# Patient Record
Sex: MALE | Race: WHITE | NOT HISPANIC OR LATINO | Employment: FULL TIME | URBAN - METROPOLITAN AREA
[De-identification: names, ages, dates, MRNs, and addresses within clinical notes are randomized per-mention and may not be internally consistent; named-entity substitution may affect disease eponyms.]

---

## 2017-09-22 ENCOUNTER — OFFICE VISIT (OUTPATIENT)
Dept: FAMILY MEDICINE | Facility: CLINIC | Age: 52
End: 2017-09-22
Payer: COMMERCIAL

## 2017-09-22 VITALS
TEMPERATURE: 98.8 F | HEIGHT: 73 IN | DIASTOLIC BLOOD PRESSURE: 78 MMHG | SYSTOLIC BLOOD PRESSURE: 118 MMHG | BODY MASS INDEX: 26.51 KG/M2 | HEART RATE: 76 BPM | WEIGHT: 200 LBS

## 2017-09-22 DIAGNOSIS — J01.01 ACUTE RECURRENT MAXILLARY SINUSITIS: Primary | ICD-10-CM

## 2017-09-22 PROCEDURE — 99213 OFFICE O/P EST LOW 20 MIN: CPT | Performed by: NURSE PRACTITIONER

## 2017-09-22 RX ORDER — DOXYCYCLINE 100 MG/1
100 CAPSULE ORAL 2 TIMES DAILY
Qty: 14 CAPSULE | Refills: 0 | Status: SHIPPED | OUTPATIENT
Start: 2017-09-22 | End: 2017-09-29

## 2017-09-22 NOTE — MR AVS SNAPSHOT
"              After Visit Summary   2017    Gutierrez Orta    MRN: [id number]           Patient Information     Date Of Birth          1965        Visit Information        Provider Department      2017 11:20 AM Lulú Conteh APRN CNP [facility]        Today's Diagnoses     Acute recurrent maxillary sinusitis    -  1      Care Instructions    Doxycycline sent to the pharmacy for symptoms-try decongestants and Mucinex     Follow up if symptoms do not improve or worsen.            Follow-ups after your visit        Who to contact     If you have questions or need follow up information about today's clinic visit or your schedule please contact [facility] directly at [phone].  Normal or non-critical lab and imaging results will be communicated to you by MyChart, letter or phone within 4 business days after the clinic has received the results. If you do not hear from us within 7 days, please contact the clinic through MyChart or phone. If you have a critical or abnormal lab result, we will notify you by phone as soon as possible.  Submit refill requests through [facility] or call your pharmacy and they will forward the refill request to us. Please allow 3 business days for your refill to be completed.          Additional Information About Your Visit        MyChart Information     [facility] lets you send messages to your doctor, view your test results, renew your prescriptions, schedule appointments and more. To sign up, go to [email or web address] . Click on \"Log in\" on the left side of the screen, which will take you to the Welcome page. Then click on \"Sign up Now\" on the right side of the page.     You will be asked to enter the access code listed below, as well as some personal information. Please follow the directions to create your username and password.     Your access code is: [email or web address]  Expires: 2017 11:46 AM     Your access code will  " "in 90 days. If you need help or a new code, please call your [facility] clinic or [phone].        Care EveryWhere ID     This is your Care EveryWhere ID. This could be used by other organizations to access your [facility] medical records  [id number]        Your Vitals Were     Pulse Temperature Height BMI (Body Mass Index)          76 98.8  F (37.1  C) (Tympanic) 6' 1\" (1.854 m) 26.39 kg/m2         Blood Pressure from Last 3 Encounters:   09/22/17 118/78   12/29/16 121/89   10/21/16 110/74    Weight from Last 3 Encounters:   09/22/17 200 lb (90.7 kg)   12/29/16 190 lb (86.2 kg)   10/21/16 189 lb (85.7 kg)              Today, you had the following     No orders found for display         Today's Medication Changes          These changes are accurate as of: 9/22/17 11:46 AM.  If you have any questions, ask your nurse or doctor.               Start taking these medicines.        Dose/Directions    doxycycline Monohydrate 100 MG Caps   Used for:  Acute recurrent maxillary sinusitis   Started by:  Lulú Conteh APRN CNP        Dose:  100 mg   Take 1 capsule (100 mg) by mouth 2 times daily for 7 days   Quantity:  14 capsule   Refills:  0            Where to get your medicines      These medications were sent to [facility] [place] [address]     Phone:  [phone]     doxycycline Monohydrate 100 MG Caps                Primary Care Provider Office Phone # Fax #    Shilo May [phone]       [address] [place], [place] [address]        Equal Access to Services     [facility] MICH : Gee Ellis, lesley riddle, rubens javed. So [facility] [phone].    ATENCIÓN: Si habla español, tiene a santana disposición servicios gratuitos de asistencia lingüística. Llame al [phone].    We comply with applicable federal civil rights laws and [place] " laws. We do not discriminate on the basis of race, color, national origin, age, disability sex, sexual orientation or gender identity.            Thank you!     Thank you for choosing [facility]  for your care. Our goal is always to provide you with excellent care. Hearing back from our patients is one way we can continue to improve our services. Please take a few minutes to complete the written survey that you may receive in the mail after your visit with us. Thank you!             Your Updated Medication List - Protect others around you: Learn how to safely use, store and throw away your medicines at www.disposemymeds.org.          This list is accurate as of: 9/22/17 11:46 AM.  Always use your most recent med list.                   Brand Name Dispense Instructions for use Diagnosis    atorvastatin 10 MG tablet    LIPITOR    90 tablet    Take 1 tablet (10 mg) by mouth daily    Hyperlipidemia LDL goal <130       doxycycline Monohydrate 100 MG Caps     14 capsule    Take 1 capsule (100 mg) by mouth 2 times daily for 7 days    Acute recurrent maxillary sinusitis       fluticasone 50 MCG/ACT spray    FLONASE    1 Bottle    Spray 2 sprays into both nostrils daily    Allergic rhinitis, unspecified allergic rhinitis trigger, unspecified rhinitis seasonality

## 2017-09-22 NOTE — PATIENT INSTRUCTIONS
Doxycycline sent to the pharmacy for symptoms-try decongestants and Mucinex     Follow up if symptoms do not improve or worsen.

## 2017-09-22 NOTE — PROGRESS NOTES
SUBJECTIVE:   Gutierrez Orta is a 51 year old male who presents to clinic today for the following health issues:    ENT Symptoms             Symptoms: cc Present Absent Comment   Fever/Chills  x  Felt hot last night and today   Fatigue   x    Muscle Aches   x    Eye Irritation   x    Sneezing  x     Nasal Ricky/Drg  x     Sinus Pressure/Pain  x     Loss of smell  x     Dental pain  x     Sore Throat x      Swollen Glands   x    Ear Pain/Fullness  x  Both ears   Cough  x     Wheeze   x    Chest Pain   x    Shortness of breath   x    Rash   x    Other   x      Symptom duration:  three days   Symptom severity:  moderate   Treatments tried:  tylenol, cough drops   Contacts:  none     Allergy symptoms started prior-worsening symptoms in past three days  Going on a hunting trip today      Problem list and histories reviewed & adjusted, as indicated.  Additional history: as documented    Patient Active Problem List   Diagnosis     Allergic rhinitis     GERD     Hyperlipidemia LDL goal <130     Past Surgical History:   Procedure Laterality Date     COLONOSCOPY N/A 12/29/2016    Procedure: COLONOSCOPY;  Surgeon: Raghu Mishra MD;  Location: [facility]     SURGICAL HISTORY OF -       Septoplasty       Social History   Substance Use Topics     Smoking status: Never Smoker     Smokeless tobacco: Never Used     Alcohol use Yes      Comment: social     Family History   Problem Relation Age of Onset     CANCER Father      Lung-smoker     Neurologic Disorder Paternal Grandfather      parkinsons     C.A.D. Maternal Grandfather      CAB in his 50s     Respiratory Maternal Grandfather      Emphysemia     Prostate Cancer No family hx of      Colon Cancer No family hx of          Current Outpatient Prescriptions   Medication Sig Dispense Refill     doxycycline Monohydrate 100 MG CAPS Take 1 capsule (100 mg) by mouth 2 times daily for 7 days 14 capsule 0     atorvastatin (LIPITOR) 10 MG tablet Take 1 tablet (10 mg) by mouth daily 90 tablet 3  "    fluticasone (FLONASE) 50 MCG/ACT nasal spray Spray 2 sprays into both nostrils daily (Patient not taking: Reported on 9/22/2017) 1 Bottle 11     No Known Allergies  Labs reviewed in EPIC      Reviewed and updated as needed this visit by clinical staffTobacco  Allergies  Meds  Med Hx  Surg Hx  Fam Hx  Soc Hx      Reviewed and updated as needed this visit by Provider         ROS:  Constitutional, HEENT, cardiovascular, pulmonary, gi and gu systems are negative, except as otherwise noted.      OBJECTIVE:   /78 (BP Location: Right arm, Cuff Size: Adult Regular)  Pulse 76  Temp 98.8  F (37.1  C) (Tympanic)  Ht 6' 1\" (1.854 m)  Wt 200 lb (90.7 kg)  BMI 26.39 kg/m2  Body mass index is 26.39 kg/(m^2).  GENERAL: healthy, alert and no distress  HENT: normal cephalic/atraumatic, both ears: clear effusion, nose and mouth without ulcers or lesions, nasal mucosa edematous , oropharynx clear, oral mucous membranes moist and sinuses: maxillary tenderness on bilateral  NECK: no adenopathy  RESP: lungs clear to auscultation - no rales, rhonchi or wheezes  CV: regular rate and rhythm, normal S1 S2, no S3 or S4, no murmur, click or rub  ABDOMEN: soft, nontender, no hepatosplenomegaly, no masses and bowel sounds normal  PSYCH: mentation appears normal, affect normal/bright    Diagnostic Test Results:  none     ASSESSMENT/PLAN:     1. Acute recurrent maxillary sinusitis  Try symptomatic care for a few more days, antibiotics only if symptoms persist.  Symptomatic care and follow up discussed.  - doxycycline Monohydrate 100 MG CAPS; Take 1 capsule (100 mg) by mouth 2 times daily for 7 days  Dispense: 14 capsule; Refill: 0    Home care instructions were reviewed with the patient. The risks, benefits and treatment options of prescribed medications or other treatments have been discussed with the patient. The patient verbalized their understanding and should call or follow up if no improvement or if they develop further " problems.    Patient Instructions   Doxycycline sent to the pharmacy for symptoms-try decongestants and Mucinex     Follow up if symptoms do not improve or worsen.        JAQUI Lizarraga CNP  [facility]

## 2017-09-22 NOTE — NURSING NOTE
"Chief Complaint   Patient presents with     Sinus Problem       Initial /78 (BP Location: Right arm, Cuff Size: Adult Regular)  Pulse 76  Temp 98.8  F (37.1  C) (Tympanic)  Ht 6' 1\" (1.854 m)  Wt 200 lb (90.7 kg)  BMI 26.39 kg/m2 Estimated body mass index is 26.39 kg/(m^2) as calculated from the following:    Height as of this encounter: 6' 1\" (1.854 m).    Weight as of this encounter: 200 lb (90.7 kg).  Medication Reconciliation: complete    Health Maintenance that is potentially due pending provider review:  NONE    Is there anyone who you would like to be able to receive your results? No  If yes have patient fill out JASMINA    Darline Story MA       "

## 2017-11-09 DIAGNOSIS — E78.5 HYPERLIPIDEMIA LDL GOAL <130: ICD-10-CM

## 2017-11-10 RX ORDER — ATORVASTATIN CALCIUM 10 MG/1
10 TABLET, FILM COATED ORAL DAILY
Qty: 90 TABLET | Refills: 0 | Status: SHIPPED | OUTPATIENT
Start: 2017-11-10 | End: 2018-03-02

## 2018-03-02 DIAGNOSIS — E78.5 HYPERLIPIDEMIA LDL GOAL <130: ICD-10-CM

## 2018-03-02 RX ORDER — ATORVASTATIN CALCIUM 10 MG/1
TABLET, FILM COATED ORAL
Qty: 30 TABLET | Refills: 0 | Status: SHIPPED | OUTPATIENT
Start: 2018-03-02 | End: 2018-04-16

## 2018-03-02 NOTE — TELEPHONE ENCOUNTER
"Requested Prescriptions   Pending Prescriptions Disp Refills     atorvastatin (LIPITOR) 10 MG tablet [Pharmacy Med Name: ATORVASTATIN CALCIUM 10MG TABS] 90 tablet 0     Sig: TAKE ONE TABLET BY MOUTH EVERY DAY    Statins Protocol Failed    3/2/2018 10:45 AM       Failed - LDL on file in past 12 months    Recent Labs   Lab Test  07/09/15   0825   LDL  103            Passed - No abnormal creatine kinase in past 12 months    No lab results found.         Passed - Recent or future visit with authorizing provider    Patient had office visit in the last year or has a visit in the next 30 days with authorizing provider.  See \"Patient Info\" tab in inbasket, or \"Choose Columns\" in Meds & Orders section of the refill encounter.            Passed - Patient is age 18 or older        Last Written Prescription Date:  11/10/2017  Last Fill Quantity: 90,  # refills: 0   Last office visit: 9/22/2017 with prescribing provider:  Wero   Future Office Visit:      "

## 2018-04-16 DIAGNOSIS — E78.5 HYPERLIPIDEMIA LDL GOAL <130: ICD-10-CM

## 2018-04-16 RX ORDER — ATORVASTATIN CALCIUM 10 MG/1
TABLET, FILM COATED ORAL
Qty: 30 TABLET | Refills: 0 | Status: SHIPPED | OUTPATIENT
Start: 2018-04-16 | End: 2018-06-11

## 2018-04-16 NOTE — TELEPHONE ENCOUNTER
"Requested Prescriptions   Pending Prescriptions Disp Refills     atorvastatin (LIPITOR) 10 MG tablet [Pharmacy Med Name: ATORVASTATIN CALCIUM 10MG TABS]        Last Written Prescription Date:  3-2-18  Last Fill Quantity: 30,   # refills: 0  Last Office Visit: 9-22-17 DOUG Conteh Sinusitis  Future Office visit:      30 tablet 0     Sig: TAKE ONE TABLET BY MOUTH EVERY DAY (NEEDS LAB)    Statins Protocol Failed    4/16/2018 10:23 AM       Failed - LDL on file in past 12 months    Recent Labs   Lab Test  07/09/15   0825   LDL  103            Passed - No abnormal creatine kinase in past 12 months    No lab results found.            Passed - Recent (12 mo) or future (30 days) visit within the authorizing provider's specialty    Patient had office visit in the last 12 months or has a visit in the next 30 days with authorizing provider or within the authorizing provider's specialty.  See \"Patient Info\" tab in inbasket, or \"Choose Columns\" in Meds & Orders section of the refill encounter.           Passed - Patient is age 18 or older          "

## 2018-06-11 DIAGNOSIS — E78.5 HYPERLIPIDEMIA LDL GOAL <130: ICD-10-CM

## 2018-06-12 NOTE — TELEPHONE ENCOUNTER
"Requested Prescriptions   Pending Prescriptions Disp Refills     atorvastatin (LIPITOR) 10 MG tablet [Pharmacy Med Name: ATORVASTATIN CALCIUM 10MG TABS] 30 tablet 0     Sig: TAKE ONE TABLET BY MOUTH EVERY DAY (NEEDS LAB)    Statins Protocol Failed    6/11/2018  8:28 PM       Failed - LDL on file in past 12 months    Recent Labs   Lab Test  07/09/15   0825   LDL  103            Passed - No abnormal creatine kinase in past 12 months    No lab results found.            Passed - Recent (12 mo) or future (30 days) visit within the authorizing provider's specialty    Patient had office visit in the last 12 months or has a visit in the next 30 days with authorizing provider or within the authorizing provider's specialty.  See \"Patient Info\" tab in inbasket, or \"Choose Columns\" in Meds & Orders section of the refill encounter.           Passed - Patient is age 18 or older        Last Written Prescription Date:  04/16/18  Last Fill Quantity: 30,  # refills: 0   Last office visit: 9/22/2017 with prescribing provider:  09/22/17   Future Office Visit:      "

## 2018-06-13 RX ORDER — ATORVASTATIN CALCIUM 10 MG/1
10 TABLET, FILM COATED ORAL DAILY
Qty: 30 TABLET | Refills: 0 | Status: SHIPPED | OUTPATIENT
Start: 2018-06-13 | End: 2018-07-25

## 2018-07-25 DIAGNOSIS — E78.5 HYPERLIPIDEMIA LDL GOAL <130: ICD-10-CM

## 2018-07-25 RX ORDER — ATORVASTATIN CALCIUM 10 MG/1
10 TABLET, FILM COATED ORAL DAILY
Qty: 30 TABLET | Refills: 0 | Status: SHIPPED | OUTPATIENT
Start: 2018-07-25 | End: 2018-09-30

## 2018-07-25 NOTE — TELEPHONE ENCOUNTER
"Requested Prescriptions   Pending Prescriptions Disp Refills     atorvastatin (LIPITOR) 10 MG tablet [Pharmacy Med Name: ATORVASTATIN CALCIUM 10MG TABS] 30 tablet 0     Sig: TAKE ONE TABLET BY MOUTH EVERY DAY **NEEDS FOLLOW-UP APPOINTMENT**    Statins Protocol Failed    7/25/2018 11:59 AM       Failed - LDL on file in past 12 months    Recent Labs   Lab Test  07/09/15   0825   LDL  103            Passed - No abnormal creatine kinase in past 12 months    No lab results found.            Passed - Recent (12 mo) or future (30 days) visit within the authorizing provider's specialty    Patient had office visit in the last 12 months or has a visit in the next 30 days with authorizing provider or within the authorizing provider's specialty.  See \"Patient Info\" tab in inbasket, or \"Choose Columns\" in Meds & Orders section of the refill encounter.           Passed - Patient is age 18 or older        Last Written Prescription Date:  06/13/18  Last Fill Quantity: 30,  # refills: 0   Last office visit: 9/22/2017 with prescribing provider:  09/22/17   Future Office Visit:      "

## 2018-08-23 DIAGNOSIS — E78.5 HYPERLIPIDEMIA LDL GOAL <130: ICD-10-CM

## 2018-08-23 LAB
CHOLEST SERPL-MCNC: 189 MG/DL
HDLC SERPL-MCNC: 39 MG/DL
LDLC SERPL CALC-MCNC: 123 MG/DL
NONHDLC SERPL-MCNC: 150 MG/DL
TRIGL SERPL-MCNC: 135 MG/DL

## 2018-08-23 PROCEDURE — 80061 LIPID PANEL: CPT | Performed by: FAMILY MEDICINE

## 2018-08-23 PROCEDURE — 36415 COLL VENOUS BLD VENIPUNCTURE: CPT | Performed by: FAMILY MEDICINE

## 2018-08-27 NOTE — PROGRESS NOTES
Please call.  LDL is a little higher than in the recent past.  HDL borderline low.   PLAN: I suggest increasing the atorvastatin to 20mg daily.  He can take two of the 10mg pills until gone and we can send prescription for the 20mg pills #90 with three refills.   ORLANDO ROWE MD

## 2018-09-30 DIAGNOSIS — E78.5 HYPERLIPIDEMIA LDL GOAL <130: ICD-10-CM

## 2018-10-01 RX ORDER — ATORVASTATIN CALCIUM 20 MG/1
20 TABLET, FILM COATED ORAL DAILY
Qty: 90 TABLET | Refills: 3 | Status: SHIPPED | OUTPATIENT
Start: 2018-10-01 | End: 2019-11-14

## 2018-10-01 NOTE — TELEPHONE ENCOUNTER
"Requested Prescriptions   Pending Prescriptions Disp Refills     atorvastatin (LIPITOR) 10 MG tablet [Pharmacy Med Name: ATORVASTATIN CALCIUM 10MG TABS] 30 tablet 0     Sig: TAKE ONE TABLET BY MOUTH EVERY DAY - DUE FOR APPOINTMENT AND FASTING LABS IN AUGUST 2018. NO FURTHER REFILLS UNTIL APPOINTMENT    Statins Protocol Failed    9/30/2018 12:29 PM       Failed - Recent (12 mo) or future (30 days) visit within the authorizing provider's specialty    Patient had office visit in the last 12 months or has a visit in the next 30 days with authorizing provider or within the authorizing provider's specialty.  See \"Patient Info\" tab in inbasket, or \"Choose Columns\" in Meds & Orders section of the refill encounter.           Passed - LDL on file in past 12 months    Recent Labs   Lab Test  08/23/18   0743   LDL  123*            Passed - No abnormal creatine kinase in past 12 months    No lab results found.            Passed - Patient is age 18 or older        Last Written Prescription Date:  7/25/18  Last Fill Quantity: 30,  # refills: 0   Last office visit: 9/22/2017 with prescribing provider:     Future Office Visit:      "

## 2019-05-13 ENCOUNTER — TELEPHONE (OUTPATIENT)
Dept: FAMILY MEDICINE | Facility: CLINIC | Age: 54
End: 2019-05-13

## 2019-05-13 DIAGNOSIS — Z11.59 MEASLES SCREENING: Primary | ICD-10-CM

## 2019-05-13 NOTE — TELEPHONE ENCOUNTER
Reason for Call: Request for an order or referral:    Order or referral being requested: Measles immunity lab test    Date needed: as soon as possible    Has the patient been seen by the PCP for this problem?     Additional comments: He saw on the news that he is in a questionable age group for needing vaccination    Phone number Patient can be reached at:  Cell number on file:    Telephone Information:   Mobile [phone]       Best Time:     Can we leave a detailed message on this number?  YES    Call taken on 5/13/2019 at 9:40 AM by Kaya Beckett

## 2019-05-22 DIAGNOSIS — Z11.59 MEASLES SCREENING: ICD-10-CM

## 2019-05-22 PROCEDURE — 36415 COLL VENOUS BLD VENIPUNCTURE: CPT | Performed by: FAMILY MEDICINE

## 2019-05-22 PROCEDURE — 86765 RUBEOLA ANTIBODY: CPT | Performed by: FAMILY MEDICINE

## 2019-05-23 LAB — MEV IGG SER QL IA: 2.3 AI (ref 0–0.8)

## 2019-11-14 DIAGNOSIS — E78.5 HYPERLIPIDEMIA LDL GOAL <130: ICD-10-CM

## 2019-11-14 RX ORDER — ATORVASTATIN CALCIUM 20 MG/1
20 TABLET, FILM COATED ORAL DAILY
Qty: 30 TABLET | Refills: 0 | Status: SHIPPED | OUTPATIENT
Start: 2019-11-14 | End: 2019-12-02

## 2019-12-02 ENCOUNTER — OFFICE VISIT (OUTPATIENT)
Dept: FAMILY MEDICINE | Facility: CLINIC | Age: 54
End: 2019-12-02
Payer: COMMERCIAL

## 2019-12-02 VITALS
SYSTOLIC BLOOD PRESSURE: 110 MMHG | BODY MASS INDEX: 27.77 KG/M2 | HEART RATE: 70 BPM | HEIGHT: 72 IN | DIASTOLIC BLOOD PRESSURE: 74 MMHG | RESPIRATION RATE: 16 BRPM | TEMPERATURE: 98.9 F | WEIGHT: 205 LBS

## 2019-12-02 DIAGNOSIS — J30.2 SEASONAL ALLERGIC RHINITIS, UNSPECIFIED TRIGGER: ICD-10-CM

## 2019-12-02 DIAGNOSIS — Z00.00 ROUTINE GENERAL MEDICAL EXAMINATION AT A HEALTH CARE FACILITY: Primary | ICD-10-CM

## 2019-12-02 DIAGNOSIS — E78.5 HYPERLIPIDEMIA LDL GOAL <130: ICD-10-CM

## 2019-12-02 DIAGNOSIS — L98.9 SKIN LESION: ICD-10-CM

## 2019-12-02 LAB
CHOLEST SERPL-MCNC: 173 MG/DL
GLUCOSE SERPL-MCNC: 96 MG/DL (ref 70–99)
HDLC SERPL-MCNC: 38 MG/DL
LDLC SERPL CALC-MCNC: 74 MG/DL
NONHDLC SERPL-MCNC: 135 MG/DL
PSA SERPL-ACNC: 0.44 UG/L (ref 0–4)
TRIGL SERPL-MCNC: 305 MG/DL

## 2019-12-02 PROCEDURE — 82947 ASSAY GLUCOSE BLOOD QUANT: CPT | Performed by: FAMILY MEDICINE

## 2019-12-02 PROCEDURE — 80061 LIPID PANEL: CPT | Performed by: FAMILY MEDICINE

## 2019-12-02 PROCEDURE — 36415 COLL VENOUS BLD VENIPUNCTURE: CPT | Performed by: FAMILY MEDICINE

## 2019-12-02 PROCEDURE — 99396 PREV VISIT EST AGE 40-64: CPT | Performed by: FAMILY MEDICINE

## 2019-12-02 PROCEDURE — G0103 PSA SCREENING: HCPCS | Performed by: FAMILY MEDICINE

## 2019-12-02 RX ORDER — ATORVASTATIN CALCIUM 20 MG/1
20 TABLET, FILM COATED ORAL DAILY
Qty: 90 TABLET | Refills: 3 | Status: SHIPPED | OUTPATIENT
Start: 2019-12-02 | End: 2021-03-30

## 2019-12-02 RX ORDER — FLUTICASONE PROPIONATE 50 MCG
2 SPRAY, SUSPENSION (ML) NASAL DAILY
Qty: 16 G | Refills: 11 | Status: SHIPPED | OUTPATIENT
Start: 2019-12-02 | End: 2022-11-04

## 2019-12-02 ASSESSMENT — MIFFLIN-ST. JEOR: SCORE: 1808.9

## 2019-12-02 NOTE — NURSING NOTE
"Chief Complaint   Patient presents with     Physical       Initial /74   Pulse 70   Temp 98.9  F (37.2  C) (Tympanic)   Resp 16   Ht 1.822 m (5' 11.75\")   Wt 93 kg (205 lb)   BMI 28.00 kg/m   Estimated body mass index is 28 kg/m  as calculated from the following:    Height as of this encounter: 1.822 m (5' 11.75\").    Weight as of this encounter: 93 kg (205 lb).    Patient presents to the clinic using     Health Maintenance that is potentially due pending provider review:          Is there anyone who you would like to be able to receive your results?   If yes have patient fill out JASMINA    "

## 2019-12-02 NOTE — PROGRESS NOTES
SUBJECTIVE:   CC: Gutierrez Orta is an 53 year old male who presents for preventative health visit.   Chief Complaint   Patient presents with     Physical      Needs refills.   He has been feeling well.     Healthy Habits:     Getting at least 3 servings of Calcium per day:  Yes    Bi-annual eye exam:  NO    Dental care twice a year:  Yes    Sleep apnea or symptoms of sleep apnea:  None    Diet:  Regular (no restrictions)    Frequency of exercise:  2-3 days/week    Duration of exercise:  30-45 minutes    Taking medications regularly:  Yes    Medication side effects:  None    PHQ-2 Total Score: 0    Additional concerns today:  No  Exercise: running biking     Today's PHQ-2 Score:   PHQ-2 ( 1999 Pfizer) 12/2/2019   Q1: Little interest or pleasure in doing things 0   Q2: Feeling down, depressed or hopeless 0   PHQ-2 Score 0   Q1: Little interest or pleasure in doing things Not at all   Q2: Feeling down, depressed or hopeless Not at all   PHQ-2 Score 0     Abuse: Current or Past(Physical, Sexual or Emotional)- No  Do you feel safe in your environment? Yes    Social History     Tobacco Use     Smoking status: Never Smoker     Smokeless tobacco: Never Used   Substance Use Topics     Alcohol use: Yes     Comment: social   Alcohol:rarely.    If you drink alcohol do you typically have >3 drinks per day or >7 drinks per week? Yes      Alcohol Use 12/2/2019   Prescreen: >3 drinks/day or >7 drinks/week? No   Prescreen: >3 drinks/day or >7 drinks/week? -       Last PSA:   PSA   Date Value Ref Range Status   10/21/2016 0.40 0 - 4 ug/L Final     Comment:     Assay Method:  Chemiluminescence using Siemens Vista analyzer     Patient Active Problem List    Diagnosis Date Noted     Hyperlipidemia LDL goal <130 03/19/2013     Priority: Medium     GERD 10/18/2006     Priority: Medium     Allergic rhinitis 10/27/2005     Priority: Medium     Problem list name updated by automated process. Provider to review          Family history:  CV  "disease: MGF  Prostate cancer: no  Colon cancer: no    Multivitamin or Vit D use: no    Vaccines:current     Past Colon cancer screenin    ROS:  General: No change in weight, sleep or appetite.  Normal energy.  No fever or chills  Eyes: Negative for vision changes or eye problems  ENT: No problems with ears, nose or throat.  No difficulty swallowing.  Resp: No coughing, wheezing or shortness of breath  CV: No chest pains or palpitations  GI: No nausea, vomiting,  heartburn, abdominal pain, diarrhea, constipation or change in bowel habits  : No urinary frequency or dysuria, bladder or kidney problems  Musculoskeletal: No significant muscle or joint pains  Neurologic: No headaches, numbness, tingling, weakness, problems with balance or coordination  Psychiatric: No problems with anxiety, depression or mental health  Heme/immune/allergy: No history of bleeding or clotting problems or anemia.  No allergies or immune system problems  Endocrine: No history of thyroid disease, diabetes or other endocrine disorders  Skin: No rashes,worrisome lesions or skin problems    OBJECTIVE:                                                    OBJECTIVE:Blood pressure 110/74, pulse 70, temperature 98.9  F (37.2  C), temperature source Tympanic, resp. rate 16, height 1.822 m (5' 11.75\"), weight 93 kg (205 lb). BMI=Body mass index is 28 kg/m .  GENERAL APPEARANCE ADULT: Alert, no acute distress  EYES: PERRL, EOM normal, conjunctiva and lids normal  HENT: Ears and TMs normal, oral mucosa and posterior oropharynx normal  NECK: No adenopathy,masses or thyromegaly  RESP: lungs clear to auscultation   CV: normal rate, regular rhythm, no murmur or gallop  ABDOMEN: soft, no organomegaly, masses or tenderness   (male): declined  MS: extremities normal, no peripheral edema  SKIN: one raised 5mm mole upper right back very dark.     ASSESSMENT/PLAN:                                                    Lifestyle recommendations:continue to " "exercise on a regular basis  weight loss recommended (ideal BMI-body mass index is <25)  The following exams/tests were recommended and discussed for health maintenance:  Colon cancer screening recommended   Prostate cancer screening is optional starting at age 50 if normal risk, age 40 or 45 for high risk men (strong family history or blacks.)  Screening can include digital rectal exam and PSA blood test.     (Z00.00) Routine general medical examination at a health care facility  (primary encounter diagnosis)  Comment: doing well  Plan: C RIV4 (FLUBLOK) VACCINE RECOMBINANT DNA PRSRV         ANTIBIO FREE, IM [03465], GLUCOSE, PSA, screen        Flu shot today.     (E78.5) Hyperlipidemia LDL goal <130  Comment:   Plan: atorvastatin (LIPITOR) 20 MG tablet, Lipid         panel reflex to direct LDL Non-fasting        Non-fasting blood tests today.     (J30.2) Seasonal allergic rhinitis, unspecified trigger  Comment: mostly seasonal.   Plan: fluticasone (FLONASE) 50 MCG/ACT nasal spray        REfills.     (L98.9) Skin lesion  Comment: upper right back  Plan: I do recommend biopsy.  Schedule an appointment with me for biopsy-20 minutes is OK.      Shingles vaccine is recommended for those adults age 50 and older.  The newer vaccine available since 2018 is very effective in preventing shingles (over 90%).  It is not currently covered by Medicare.  Check at pharmacy for this vaccine, they can check on your cost and give you the vaccine.   The vaccine may be less expensive at a pharmacy.      COUNSELING:   Reviewed preventive health counseling, as reflected in patient instructions  Special attention given to:        HIV screeninx in teen years, 1x in adult years, and at intervals if high risk       Prostate cancer screening    Estimated body mass index is 28 kg/m  as calculated from the following:    Height as of this encounter: 1.822 m (5' 11.75\").    Weight as of this encounter: 93 kg (205 lb).     Weight " management plan: Discussed healthy diet and exercise guidelines     reports that he has never smoked. He has never used smokeless tobacco.      Counseling Resources:  ATP IV Guidelines  Pooled Cohorts Equation Calculator  FRAX Risk Assessment  ICSI Preventive Guidelines  Dietary Guidelines for Americans, 2010  USDA's MyPlate  ASA Prophylaxis  Lung CA Screening    Shilo May MD  [facility]

## 2019-12-02 NOTE — LETTER
"December 3, 2019      Gutierrez Orta  [address] [facility] [address]        Dear ,    We are writing to inform you of your test results.    Triglycerides, a type of fat found in the bloodstream is high.  HDL (\"good cholesterol\") is low.    The LDL \"bad cholesterol\" is at goal level.   PSA test (for prostate cancer screening) is normal.   The blood glucose, a test for diabetes mellitus is in the normal range.   PLAN: No new changes in treatment recommended.     Resulted Orders   GLUCOSE   Result Value Ref Range    Glucose 96 70 - 99 mg/dL      Comment:      Non Fasting   Lipid panel reflex to direct LDL Non-fasting   Result Value Ref Range    Cholesterol 173 <200 mg/dL    Triglycerides 305 (H) <150 mg/dL      Comment:      Borderline high:  150-199 mg/dl  High:             200-499 mg/dl  Very high:       >499 mg/dl  Non Fasting      HDL Cholesterol 38 (L) >39 mg/dL    LDL Cholesterol Calculated 74 <100 mg/dL      Comment:      Desirable:       <100 mg/dl    Non HDL Cholesterol 135 (H) <130 mg/dL      Comment:      Above Desirable:  130-159 mg/dl  Borderline high:  160-189 mg/dl  High:             190-219 mg/dl  Very high:       >219 mg/dl     PSA, screen   Result Value Ref Range    PSA 0.44 0 - 4 ug/L      Comment:      Assay Method:  Chemiluminescence using Siemens Vista analyzer       If you have any questions or concerns, please call the clinic at the number listed above.       Sincerely,        Shilo May MD                "

## 2019-12-02 NOTE — PATIENT INSTRUCTIONS
Preventive Health Recommendations  Male Ages 50 - 64    Yearly exam:             See your health care provider every year in order to  o   Review health changes.   o   Discuss preventive care.    o   Review your medicines if your doctor has prescribed any.     Have a cholesterol test every 5 years, or more frequently if you are at risk for high cholesterol/heart disease.     Have a diabetes test (fasting glucose) every three years. If you are at risk for diabetes, you should have this test more often.     Have a colonoscopy at age 50, or have a yearly FIT test (stool test). These exams will check for colon cancer.      Talk with your health care provider about whether or not a prostate cancer screening test (PSA) is right for you.    You should be tested each year for STDs (sexually transmitted diseases), if you re at risk.     Shots: Get a flu shot each year. Get a tetanus shot every 10 years.     Nutrition:    Eat at least 5 servings of fruits and vegetables daily.     Eat whole-grain bread, whole-wheat pasta and brown rice instead of white grains and rice.     Get adequate Calcium and Vitamin D.     Lifestyle    Exercise for at least 150 minutes a week (30 minutes a day, 5 days a week). This will help you control your weight and prevent disease.     Limit alcohol to one drink per day.     No smoking.     Wear sunscreen to prevent skin cancer.     See your dentist every six months for an exam and cleaning.     See your eye doctor every 1 to 2 years.    ASSESSMENT/PLAN:                                                    Lifestyle recommendations:continue to exercise on a regular basis  weight loss recommended (ideal BMI-body mass index is <25)  The following exams/tests were recommended and discussed for health maintenance:  Colon cancer screening recommended 2026  Prostate cancer screening is optional starting at age 50 if normal risk, age 40 or 45 for high risk men (strong family history or blacks.)  Screening  can include digital rectal exam and PSA blood test.     (Z00.00) Routine general medical examination at a health care facility  (primary encounter diagnosis)  Comment: doing well  Plan: C RIV4 (FLUBLOK) VACCINE RECOMBINANT DNA PRSRV         ANTIBIO FREE, IM [12838], GLUCOSE, PSA, screen        Flu shot today.     (E78.5) Hyperlipidemia LDL goal <130  Comment:   Plan: atorvastatin (LIPITOR) 20 MG tablet, Lipid         panel reflex to direct LDL Non-fasting        Non-fasting blood tests today.     (J30.2) Seasonal allergic rhinitis, unspecified trigger  Comment: mostly seasonal.   Plan: fluticasone (FLONASE) 50 MCG/ACT nasal spray        REfills.     (L98.9) Skin lesion  Comment: upper right back  Plan: I do recommend biopsy.  Schedule an appointment with me for biopsy-20 minutes is OK.      Shingles vaccine is recommended for those adults age 50 and older.  The newer vaccine available since 2018 is very effective in preventing shingles (over 90%).  It is not currently covered by Medicare.  Check at pharmacy for this vaccine, they can check on your cost and give you the vaccine.   The vaccine may be less expensive at a pharmacy.

## 2019-12-03 NOTE — RESULT ENCOUNTER NOTE
"Please call.  Triglycerides, a type of fat found in the bloodstream is high.  HDL (\"good cholesterol\") is low.    The LDL \"bad cholesterol\" is at goal level.   PSA test (for prostate cancer screening) is normal.   The blood glucose, a test for diabetes mellitus is in the normal range.   PLAN: No new changes in treatment recommended."

## 2019-12-23 ENCOUNTER — OFFICE VISIT (OUTPATIENT)
Dept: FAMILY MEDICINE | Facility: CLINIC | Age: 54
End: 2019-12-23
Payer: COMMERCIAL

## 2019-12-23 VITALS
HEART RATE: 70 BPM | DIASTOLIC BLOOD PRESSURE: 80 MMHG | RESPIRATION RATE: 16 BRPM | WEIGHT: 208 LBS | SYSTOLIC BLOOD PRESSURE: 120 MMHG | TEMPERATURE: 98.3 F | HEIGHT: 72 IN | BODY MASS INDEX: 28.17 KG/M2

## 2019-12-23 DIAGNOSIS — L98.9 SKIN LESION: Primary | ICD-10-CM

## 2019-12-23 PROCEDURE — 11106 INCAL BX SKN SINGLE LES: CPT | Performed by: FAMILY MEDICINE

## 2019-12-23 PROCEDURE — 88305 TISSUE EXAM BY PATHOLOGIST: CPT | Performed by: FAMILY MEDICINE

## 2019-12-23 ASSESSMENT — MIFFLIN-ST. JEOR: SCORE: 1817.51

## 2019-12-23 NOTE — PROGRESS NOTES
"SUBJECTIVE: Gutierrez Orta is a 54 year old male  Chief Complaint   Patient presents with     Biopsy      Skin lesion  Upper back      Duration: years    Description (location/character/radiation): raised area    Intensity:      Accompanying signs and symptoms: none    History (similar episodes/previous evaluation): None    Precipitating or alleviating factors: None    Therapies tried and outcome: None     Very dark raised skin lesion upper back noted on physical exam.   I recommended return for biopsy.     Patient Active Problem List   Diagnosis     Allergic rhinitis     GERD     Hyperlipidemia LDL goal <130      OBJECTIVE:Blood pressure 120/80, pulse 70, temperature 98.3  F (36.8  C), temperature source Tympanic, resp. rate 16, height 1.822 m (5' 11.75\"), weight 94.3 kg (208 lb). BMI=Body mass index is 28.41 kg/m .  GENERAL APPEARANCE ADULT: Alert, no acute distress  SKIN: 5mm raised very dark skin spot upper back.  .R/O melanoma.      ASSESSMENT:   (L98.9) Skin lesion  (primary encounter diagnosis)  Comment: R/O melanoma  Plan: Surgical pathology exam        Informed consent was obtained.    Skin cleansing with povodine iodine solution     1% Lidocaine with epinephrine for local anesthetic.     With sterile technique, punch biopsy size 6 mm was performed.   Wound closure: 2, 5-0 nylon sutures inserted.   Dressing bandaid    Wound care instructions:     Recheck with any signs of wound infection or problems like redness, tenderness or a lot of swelling.  The procedure was well tolerated without complications.  Follow up: The specimen is labelled and sent to pathology for evaluation., Schedule suture removal in 7 days with clinic RN.   Can remove sutures at home if desired.              "

## 2019-12-23 NOTE — NURSING NOTE
"Chief Complaint   Patient presents with     Biopsy       Initial /80   Pulse 70   Temp 98.3  F (36.8  C) (Tympanic)   Resp 16   Ht 1.822 m (5' 11.75\")   Wt 94.3 kg (208 lb)   BMI 28.41 kg/m   Estimated body mass index is 28.41 kg/m  as calculated from the following:    Height as of this encounter: 1.822 m (5' 11.75\").    Weight as of this encounter: 94.3 kg (208 lb).    Patient presents to the clinic using     Health Maintenance that is potentially due pending provider review:          Is there anyone who you would like to be able to receive your results?   If yes have patient fill out JASMINA    "

## 2019-12-26 LAB — COPATH REPORT: NORMAL

## 2020-09-28 ENCOUNTER — OFFICE VISIT (OUTPATIENT)
Dept: FAMILY MEDICINE | Facility: CLINIC | Age: 55
End: 2020-09-28
Payer: COMMERCIAL

## 2020-09-28 VITALS
RESPIRATION RATE: 16 BRPM | DIASTOLIC BLOOD PRESSURE: 80 MMHG | HEART RATE: 66 BPM | TEMPERATURE: 98 F | HEIGHT: 72 IN | BODY MASS INDEX: 29.8 KG/M2 | SYSTOLIC BLOOD PRESSURE: 120 MMHG | WEIGHT: 220 LBS

## 2020-09-28 DIAGNOSIS — L98.9 SKIN LESION: Primary | ICD-10-CM

## 2020-09-28 PROCEDURE — 17110 DESTRUCTION B9 LES UP TO 14: CPT | Performed by: FAMILY MEDICINE

## 2020-09-28 ASSESSMENT — MIFFLIN-ST. JEOR: SCORE: 1871.94

## 2020-09-28 NOTE — PATIENT INSTRUCTIONS
ASSESSMENT:   (L98.9) Skin lesion  (primary encounter diagnosis)  Comment: possible seborrheic keratosis which has been irritated  Plan:   Treatment was begun with Liquid nitrogen, freeze/thaw/freeze.  Expect redness for a day or two, then possible blister or sometimes blood blister.  Then the wound may get raw and sore and scab over.  Skin spots should steadily heal and look pink for a few weeks.  New skin should be smooth and the rough irregular skin gone.  Recheck if abnormal skin does not disappear with the treatment.

## 2020-09-28 NOTE — NURSING NOTE
"Chief Complaint   Patient presents with     Derm Problem       Initial /80   Pulse 66   Temp 98  F (36.7  C) (Tympanic)   Resp 16   Ht 1.822 m (5' 11.75\")   Wt 99.8 kg (220 lb)   BMI 30.05 kg/m   Estimated body mass index is 30.05 kg/m  as calculated from the following:    Height as of this encounter: 1.822 m (5' 11.75\").    Weight as of this encounter: 99.8 kg (220 lb).    Patient presents to the clinic using     Health Maintenance that is potentially due pending provider review:          Is there anyone who you would like to be able to receive your results? If yes have patient fill out JASMINA    "

## 2020-09-28 NOTE — PROGRESS NOTES
"SUBJECTIVE: Gutierrez Orta is a 54 year old male  Chief Complaint   Patient presents with     Derm Problem        Concern - Check growth on left shoulder  Onset: 5-6 weeks  Description: raised area on left shoulder  Intensity: mild  Progression of Symptoms:  worsening  Accompanying Signs & Symptoms: area peeled off  Itchy   Previous history of similar problem: none  Precipitating factors:        Worsened by: clothes   Alleviating factors:        Improved by:   Therapies tried and outcome:  none   Can be itchy. Can \"break off\" a little.      Patient Active Problem List   Diagnosis     Allergic rhinitis     GERD     Hyperlipidemia LDL goal <130      ROS:  Weight gain.      OBJECTIVE:Blood pressure 120/80, pulse 66, temperature 98  F (36.7  C), temperature source Tympanic, resp. rate 16, height 1.822 m (5' 11.75\"), weight 99.8 kg (220 lb). BMI=Body mass index is 30.05 kg/m .  GENERAL APPEARANCE ADULT: Alert, no acute distress  SKIN: He has a small 3mm raised excoriated skin spot superior left shoulder.  Uncertain what original lesion was not typical for skin cancer.     ASSESSMENT:   (L98.9) Skin lesion  (primary encounter diagnosis)  Comment: possible seborrheic keratosis which has been irritated  Plan:   Treatment was begun with Liquid nitrogen, freeze/thaw/freeze.  Expect redness for a day or two, then possible blister or sometimes blood blister.  Then the wound may get raw and sore and scab over.  Skin spots should steadily heal and look pink for a few weeks.  New skin should be smooth and the rough irregular skin gone.  Recheck if abnormal skin does not disappear with the treatment.          "

## 2021-03-29 DIAGNOSIS — E78.5 HYPERLIPIDEMIA LDL GOAL <130: ICD-10-CM

## 2021-03-30 RX ORDER — ATORVASTATIN CALCIUM 20 MG/1
TABLET, FILM COATED ORAL
Qty: 90 TABLET | Refills: 0 | Status: SHIPPED | OUTPATIENT
Start: 2021-03-30 | End: 2021-10-12

## 2021-04-30 ENCOUNTER — OFFICE VISIT (OUTPATIENT)
Dept: FAMILY MEDICINE | Facility: CLINIC | Age: 56
End: 2021-04-30
Payer: COMMERCIAL

## 2021-04-30 VITALS
HEART RATE: 76 BPM | TEMPERATURE: 97.9 F | RESPIRATION RATE: 15 BRPM | BODY MASS INDEX: 29.66 KG/M2 | HEIGHT: 72 IN | DIASTOLIC BLOOD PRESSURE: 80 MMHG | WEIGHT: 219 LBS | SYSTOLIC BLOOD PRESSURE: 122 MMHG | OXYGEN SATURATION: 100 %

## 2021-04-30 DIAGNOSIS — K21.9 GASTROESOPHAGEAL REFLUX DISEASE WITHOUT ESOPHAGITIS: ICD-10-CM

## 2021-04-30 DIAGNOSIS — R13.10 DYSPHAGIA, UNSPECIFIED TYPE: Primary | ICD-10-CM

## 2021-04-30 PROCEDURE — 99214 OFFICE O/P EST MOD 30 MIN: CPT | Performed by: FAMILY MEDICINE

## 2021-04-30 RX ORDER — OMEPRAZOLE 40 MG/1
40 CAPSULE, DELAYED RELEASE ORAL DAILY
Qty: 30 CAPSULE | Refills: 1 | Status: SHIPPED | OUTPATIENT
Start: 2021-04-30 | End: 2022-11-04

## 2021-04-30 ASSESSMENT — PAIN SCALES - GENERAL: PAINLEVEL: NO PAIN (0)

## 2021-04-30 ASSESSMENT — MIFFLIN-ST. JEOR: SCORE: 1866.38

## 2021-04-30 NOTE — PROGRESS NOTES
ASSESSMENT:     ICD-10-CM    1. Dysphagia, unspecified type  R13.10 GASTROENTEROLOGY ADULT REF PROCEDURE ONLY     omeprazole (PRILOSEC) 40 MG DR capsule   2. Gastroesophageal reflux disease without esophagitis  K21.9 GASTROENTEROLOGY ADULT REF PROCEDURE ONLY     omeprazole (PRILOSEC) 40 MG DR capsule      This may be from gastroesophageal reflux disease.  Sometimes there is another reason for blockage in the esophagus-food tube like scars, stricture-narrowing, cancer.  We can find out the cause with a gastroscopy.     PLAN:   Schedule an appointment for colonoscopies or gastroscopies with surgery scheduling.  Call [phone]  to schedule the procedures.     Gastroesophageal reflux (GERD) lifestyle changes that can help improve symptoms include:  Eating smaller meals and not lying down after meals  Elevate head of bed with 6-8 inch blocks or a wedge pillow.   Avoid lying flat on back after eating and at bedtime  Avoid tight fitting clothing  Avoid reflux inducing foods like fat, caffeine, chocolate, carbonated beverages  Maintain an ideal body weight  Avoid alcohol and tobacco  Avoid medications like ibuprofen, naproxen and aspirin    Take omeprazole 40mg  daily for 4-6 weeks.    Take in AM 30 minutes before meals if possible.      Subjective   Gutierrez is a 55 year old who presents for the following health issues   Chief Complaint   Patient presents with     Throat Problem     States has intermittent problems swallowing issues and some heart burn discomfort x 6 weeks.      Onset of symptoms: 1 and 1/2 months ago.  Eating sandwich-seemed to get sore when swallowed.    Heartburn at night, seems to be happening more.   Now sometimes daytime heartburn.    Increasing difficulty swallowing in the past 6 weeks.    Feels like a dull ache in mid sternal area when swallowing.      Most of  The time he does not have a problem but feels like things get hung up a little.  Better with drinking something.   Aggravating factors: dry  foods, larger pieces of food.    He has noted on occasion with liquids with soreness.    He thinks  Weight may contribute.   Alleviating factors: Tums.  Takes about every third to fourth night.   Not taking aspirin or NSAIDS.     GERD/Heartburn  Onset/Duration: 6 weeks  Description: Intermittent difficulty swallowing upper chest discomfort- heart  burn  Intensity: mild  Progression of Symptoms: same intermittent  Accompanying Signs & Symptoms:  Does it feel like food gets stuck or trouble swallowing: YES  Nausea: no  Vomiting (bloody?): no  Abdominal Pain: no  Black-Tarry stools: no  Bloody stools: no  History:  Previous similar episodes: no  Previous ulcers: no  Precipitating factors:   Caffeine use: YES- not daily.  Pop about every other day.  Alcohol use: YES- 0-1 week  NSAID/Aspirin use: no  Tobacco use: no  Worse with sometimes milk.  Alleviating factors: None  Therapies tried and outcome:             Lifestyle changes: None maybe less exercise            Medications: antacids and tums    Patient Active Problem List   Diagnosis     Allergic rhinitis     GERD     Hyperlipidemia LDL goal <130      ROS:General: POSITIVE for:, weight gain, 10# in 16 months.   Resp: No coughing, wheezing or shortness of breath  CV: No chest pains or palpitations  GI: Negative for nausea, vomiting, change in bowel habits  : No urinary frequency or dysuria, bladder or kidney problems  Musculoskeletal: POSITIVE  for:, pain .rhip and knee sometimes-ache.     OBJECTIVE:Blood pressure 122/80, pulse 76, temperature 97.9  F (36.6  C), temperature source Tympanic, resp. rate 15, height 1.829 m (6'), weight 99.3 kg (219 lb), SpO2 100 %. BMI=Body mass index is 29.7 kg/m .  GENERAL APPEARANCE ADULT: Alert, no acute distress  HENT: oral mucous membranes moist, oropharynx clear  NECK: No adenopathy,masses or thyromegaly  RESP: lungs clear to auscultation   CV: normal rate, regular rhythm, no murmur or gallop  ABDOMEN: soft, no organomegaly,  masses or tenderness

## 2021-04-30 NOTE — NURSING NOTE
Chief Complaint   Patient presents with     Throat Problem     States has intermittent problems swallowing issues and some heart burn discomfort x 6 weeks.       Initial /80 (BP Location: Right arm, Patient Position: Chair, Cuff Size: Adult Large)   Pulse 76   Temp 97.9  F (36.6  C) (Tympanic)   Resp 15   Ht 1.829 m (6')   Wt 99.3 kg (219 lb)   SpO2 100%   BMI 29.70 kg/m   Estimated body mass index is 29.7 kg/m  as calculated from the following:    Height as of this encounter: 1.829 m (6').    Weight as of this encounter: 99.3 kg (219 lb).    Patient presents to the clinic using No DME    Health Maintenance that is potentially due pending provider review:  NONE    n/a    Is there anyone who you would like to be able to receive your results? No  If yes have patient fill out JASMINA  Bibi Pollock,CMA

## 2021-04-30 NOTE — H&P (VIEW-ONLY)
ASSESSMENT:     ICD-10-CM    1. Dysphagia, unspecified type  R13.10 GASTROENTEROLOGY ADULT REF PROCEDURE ONLY     omeprazole (PRILOSEC) 40 MG DR capsule   2. Gastroesophageal reflux disease without esophagitis  K21.9 GASTROENTEROLOGY ADULT REF PROCEDURE ONLY     omeprazole (PRILOSEC) 40 MG DR capsule      This may be from gastroesophageal reflux disease.  Sometimes there is another reason for blockage in the esophagus-food tube like scars, stricture-narrowing, cancer.  We can find out the cause with a gastroscopy.     PLAN:   Schedule an appointment for colonoscopies or gastroscopies with surgery scheduling.  Call [phone]  to schedule the procedures.     Gastroesophageal reflux (GERD) lifestyle changes that can help improve symptoms include:  Eating smaller meals and not lying down after meals  Elevate head of bed with 6-8 inch blocks or a wedge pillow.   Avoid lying flat on back after eating and at bedtime  Avoid tight fitting clothing  Avoid reflux inducing foods like fat, caffeine, chocolate, carbonated beverages  Maintain an ideal body weight  Avoid alcohol and tobacco  Avoid medications like ibuprofen, naproxen and aspirin    Take omeprazole 40mg  daily for 4-6 weeks.    Take in AM 30 minutes before meals if possible.      Subjective   Gutierrez is a 55 year old who presents for the following health issues   Chief Complaint   Patient presents with     Throat Problem     States has intermittent problems swallowing issues and some heart burn discomfort x 6 weeks.      Onset of symptoms: 1 and 1/2 months ago.  Eating sandwich-seemed to get sore when swallowed.    Heartburn at night, seems to be happening more.   Now sometimes daytime heartburn.    Increasing difficulty swallowing in the past 6 weeks.    Feels like a dull ache in mid sternal area when swallowing.      Most of  The time he does not have a problem but feels like things get hung up a little.  Better with drinking something.   Aggravating factors: dry  foods, larger pieces of food.    He has noted on occasion with liquids with soreness.    He thinks  Weight may contribute.   Alleviating factors: Tums.  Takes about every third to fourth night.   Not taking aspirin or NSAIDS.     GERD/Heartburn  Onset/Duration: 6 weeks  Description: Intermittent difficulty swallowing upper chest discomfort- heart  burn  Intensity: mild  Progression of Symptoms: same intermittent  Accompanying Signs & Symptoms:  Does it feel like food gets stuck or trouble swallowing: YES  Nausea: no  Vomiting (bloody?): no  Abdominal Pain: no  Black-Tarry stools: no  Bloody stools: no  History:  Previous similar episodes: no  Previous ulcers: no  Precipitating factors:   Caffeine use: YES- not daily.  Pop about every other day.  Alcohol use: YES- 0-1 week  NSAID/Aspirin use: no  Tobacco use: no  Worse with sometimes milk.  Alleviating factors: None  Therapies tried and outcome:             Lifestyle changes: None maybe less exercise            Medications: antacids and tums    Patient Active Problem List   Diagnosis     Allergic rhinitis     GERD     Hyperlipidemia LDL goal <130      ROS:General: POSITIVE for:, weight gain, 10# in 16 months.   Resp: No coughing, wheezing or shortness of breath  CV: No chest pains or palpitations  GI: Negative for nausea, vomiting, change in bowel habits  : No urinary frequency or dysuria, bladder or kidney problems  Musculoskeletal: POSITIVE  for:, pain .rhip and knee sometimes-ache.     OBJECTIVE:Blood pressure 122/80, pulse 76, temperature 97.9  F (36.6  C), temperature source Tympanic, resp. rate 15, height 1.829 m (6'), weight 99.3 kg (219 lb), SpO2 100 %. BMI=Body mass index is 29.7 kg/m .  GENERAL APPEARANCE ADULT: Alert, no acute distress  HENT: oral mucous membranes moist, oropharynx clear  NECK: No adenopathy,masses or thyromegaly  RESP: lungs clear to auscultation   CV: normal rate, regular rhythm, no murmur or gallop  ABDOMEN: soft, no organomegaly,  masses or tenderness

## 2021-04-30 NOTE — PATIENT INSTRUCTIONS
ASSESSMENT:     ICD-10-CM    1. Dysphagia, unspecified type  R13.10 GASTROENTEROLOGY ADULT REF PROCEDURE ONLY     omeprazole (PRILOSEC) 40 MG DR capsule   2. Gastroesophageal reflux disease without esophagitis  K21.9 GASTROENTEROLOGY ADULT REF PROCEDURE ONLY     omeprazole (PRILOSEC) 40 MG DR capsule      This may be from gastroesophageal reflux disease.  Sometimes there is another reason for blockage in the esophagus-food tube like scars, stricture-narrowing, cancer.     Schedule an appointment for colonoscopies or gastroscopies with surgery scheduling.  Call [phone]  to schedule the procedures.     Gastroesophageal reflux (GERD) lifestyle changes that can help improve symptoms include:  Eating smaller meals and not lying down after meals  Elevate head of bed with 6-8 inch blocks or a wedge pillow.   Avoid lying flat on back after eating and at bedtime  Avoid tight fitting clothing  Avoid reflux inducing foods like fat, caffeine, chocolate, carbonated beverages  Maintain an ideal body weight  Avoid alcohol and tobacco  Avoid medications like ibuprofen, naproxen and aspirin    Take omeprazole 40mg  daily for 4-6 weeks.    Take in AM 30 minutes before meals if possible.

## 2021-05-04 DIAGNOSIS — Z11.59 ENCOUNTER FOR SCREENING FOR OTHER VIRAL DISEASES: ICD-10-CM

## 2021-05-05 DIAGNOSIS — Z11.59 ENCOUNTER FOR SCREENING FOR OTHER VIRAL DISEASES: ICD-10-CM

## 2021-05-05 LAB
SARS-COV-2 RNA RESP QL NAA+PROBE: NORMAL
SPECIMEN SOURCE: NORMAL

## 2021-05-05 PROCEDURE — U0005 INFEC AGEN DETEC AMPLI PROBE: HCPCS | Performed by: SURGERY

## 2021-05-05 PROCEDURE — U0003 INFECTIOUS AGENT DETECTION BY NUCLEIC ACID (DNA OR RNA); SEVERE ACUTE RESPIRATORY SYNDROME CORONAVIRUS 2 (SARS-COV-2) (CORONAVIRUS DISEASE [COVID-19]), AMPLIFIED PROBE TECHNIQUE, MAKING USE OF HIGH THROUGHPUT TECHNOLOGIES AS DESCRIBED BY CMS-2020-01-R: HCPCS | Performed by: SURGERY

## 2021-05-06 ENCOUNTER — ANESTHESIA EVENT (OUTPATIENT)
Dept: GASTROENTEROLOGY | Facility: CLINIC | Age: 56
End: 2021-05-06
Payer: COMMERCIAL

## 2021-05-06 LAB
LABORATORY COMMENT REPORT: NORMAL
SARS-COV-2 RNA RESP QL NAA+PROBE: NEGATIVE
SPECIMEN SOURCE: NORMAL

## 2021-05-06 NOTE — ANESTHESIA PREPROCEDURE EVALUATION
Anesthesia Pre-Procedure Evaluation    Patient: Gutierrez Orta   MRN: [id number] : 1965        Preoperative Diagnosis: Dysphagia [R13.10]  GERD without esophagitis [K21.9]   Procedure : Procedure(s):  ESOPHAGOGASTRODUODENOSCOPY (EGD)     Past Medical History:   Diagnosis Date     HAY FEVER       Past Surgical History:   Procedure Laterality Date     COLONOSCOPY N/A 2016    Procedure: COLONOSCOPY;  Surgeon: Raghu Mishra MD;  Location: [facility]     SURGICAL HISTORY OF -       Septoplasty      No Known Allergies   Social History     Tobacco Use     Smoking status: Never Smoker     Smokeless tobacco: Never Used   Substance Use Topics     Alcohol use: Yes     Comment: social      Wt Readings from Last 1 Encounters:   21 99.3 kg (219 lb)        Anesthesia Evaluation   Pt has had prior anesthetic.         ROS/MED HX  ENT/Pulmonary:     (+) allergic rhinitis,     Neurologic:       Cardiovascular:     (+) Dyslipidemia -----    METS/Exercise Tolerance:     Hematologic:       Musculoskeletal:       GI/Hepatic:     (+) GERD,     Renal/Genitourinary:       Endo:       Psychiatric/Substance Use:       Infectious Disease:       Malignancy:       Other:            Physical Exam    Airway  airway exam normal      Mallampati: II   TM distance: > 3 FB   Neck ROM: full   Mouth opening: > 3 cm    Respiratory Devices and Support         Dental  no notable dental history         Cardiovascular   cardiovascular exam normal          Pulmonary   pulmonary exam normal                OUTSIDE LABS:  CBC: No results found for: WBC, HGB, HCT, PLT  BMP:   Lab Results   Component Value Date    GLC 96 2019     COAGS: No results found for: PTT, INR, FIBR  POC: No results found for: BGM, HCG, HCGS  HEPATIC: No results found for: ALBUMIN, PROTTOTAL, ALT, AST, GGT, ALKPHOS, BILITOTAL, BILIDIRECT, DULCE  OTHER: No results found for: PH, LACT, A1C, LAURA, PHOS, MAG, LIPASE, AMYLASE, TSH, T4, T3, CRP, SED    Anesthesia Plan    ASA  Status:  2   NPO Status:  NPO Appropriate    Anesthesia Type: General.   Induction: Propofol.   Maintenance: TIVA.        Consents    Anesthesia Plan(s) and associated risks, benefits, and realistic alternatives discussed. Questions answered and patient/representative(s) expressed understanding.     - Discussed with:  Patient         Postoperative Care            Comments:                JAQUI Moon CRNA

## 2021-05-07 ENCOUNTER — ANESTHESIA (OUTPATIENT)
Dept: GASTROENTEROLOGY | Facility: CLINIC | Age: 56
End: 2021-05-07
Payer: COMMERCIAL

## 2021-05-07 ENCOUNTER — HOSPITAL ENCOUNTER (OUTPATIENT)
Facility: CLINIC | Age: 56
Discharge: HOME | End: 2021-05-07
Attending: SURGERY | Admitting: SURGERY
Payer: COMMERCIAL

## 2021-05-07 VITALS
HEART RATE: 82 BPM | TEMPERATURE: 97.7 F | SYSTOLIC BLOOD PRESSURE: 99 MMHG | BODY MASS INDEX: 29.7 KG/M2 | RESPIRATION RATE: 16 BRPM | OXYGEN SATURATION: 94 % | HEIGHT: 72 IN | DIASTOLIC BLOOD PRESSURE: 76 MMHG

## 2021-05-07 LAB — UPPER GI ENDOSCOPY: NORMAL

## 2021-05-07 PROCEDURE — 43239 EGD BIOPSY SINGLE/MULTIPLE: CPT | Performed by: SURGERY

## 2021-05-07 PROCEDURE — 370N000017 HC ANESTHESIA TECHNICAL FEE, PER MIN: Performed by: SURGERY

## 2021-05-07 PROCEDURE — 250N000009 HC RX 250: Performed by: SURGERY

## 2021-05-07 PROCEDURE — 250N000009 HC RX 250: Performed by: NURSE ANESTHETIST, CERTIFIED REGISTERED

## 2021-05-07 PROCEDURE — 250N000011 HC RX IP 250 OP 636: Performed by: NURSE ANESTHETIST, CERTIFIED REGISTERED

## 2021-05-07 PROCEDURE — 88342 IMHCHEM/IMCYTCHM 1ST ANTB: CPT | Mod: 26 | Performed by: PATHOLOGY

## 2021-05-07 PROCEDURE — 88305 TISSUE EXAM BY PATHOLOGIST: CPT | Mod: TC | Performed by: SURGERY

## 2021-05-07 PROCEDURE — 88305 TISSUE EXAM BY PATHOLOGIST: CPT | Mod: 26 | Performed by: PATHOLOGY

## 2021-05-07 PROCEDURE — 258N000003 HC RX IP 258 OP 636: Performed by: SURGERY

## 2021-05-07 PROCEDURE — 88342 IMHCHEM/IMCYTCHM 1ST ANTB: CPT | Mod: TC | Performed by: SURGERY

## 2021-05-07 RX ORDER — ONDANSETRON 2 MG/ML
4 INJECTION INTRAMUSCULAR; INTRAVENOUS
Status: DISCONTINUED | OUTPATIENT
Start: 2021-05-07 | End: 2021-05-07 | Stop reason: HOSPADM

## 2021-05-07 RX ORDER — SODIUM CHLORIDE, SODIUM LACTATE, POTASSIUM CHLORIDE, CALCIUM CHLORIDE 600; 310; 30; 20 MG/100ML; MG/100ML; MG/100ML; MG/100ML
INJECTION, SOLUTION INTRAVENOUS CONTINUOUS
Status: DISCONTINUED | OUTPATIENT
Start: 2021-05-07 | End: 2021-05-07 | Stop reason: HOSPADM

## 2021-05-07 RX ORDER — LIDOCAINE 40 MG/G
CREAM TOPICAL
Status: DISCONTINUED | OUTPATIENT
Start: 2021-05-07 | End: 2021-05-07 | Stop reason: HOSPADM

## 2021-05-07 RX ORDER — PROPOFOL 10 MG/ML
INJECTION, EMULSION INTRAVENOUS PRN
Status: DISCONTINUED | OUTPATIENT
Start: 2021-05-07 | End: 2021-05-07

## 2021-05-07 RX ADMIN — PROPOFOL 100 MG: 10 INJECTION, EMULSION INTRAVENOUS at 11:15

## 2021-05-07 RX ADMIN — SODIUM CHLORIDE, POTASSIUM CHLORIDE, SODIUM LACTATE AND CALCIUM CHLORIDE: 600; 310; 30; 20 INJECTION, SOLUTION INTRAVENOUS at 10:29

## 2021-05-07 RX ADMIN — PROPOFOL 150 MG: 10 INJECTION, EMULSION INTRAVENOUS at 11:11

## 2021-05-07 RX ADMIN — PROPOFOL 150 MG: 10 INJECTION, EMULSION INTRAVENOUS at 11:13

## 2021-05-07 RX ADMIN — TOPICAL ANESTHETIC 2 SPRAY: 200 SPRAY DENTAL; PERIODONTAL at 11:09

## 2021-05-07 RX ADMIN — LIDOCAINE HYDROCHLORIDE 1 ML: 10 INJECTION, SOLUTION EPIDURAL; INFILTRATION; INTRACAUDAL; PERINEURAL at 10:30

## 2021-05-07 NOTE — ANESTHESIA CARE TRANSFER NOTE
Patient: Gutierrez Orta    Procedure(s):  ESOPHAGOGASTRODUODENOSCOPY, WITH BIOPSY    Diagnosis: Dysphagia [R13.10]  GERD without esophagitis [K21.9]  Diagnosis Additional Information: No value filed.    Anesthesia Type:   General     Note:    Oropharynx: oropharynx clear of all foreign objects  Level of Consciousness: awake  Oxygen Supplementation: room air    Independent Airway: airway patency satisfactory and stable  Dentition: dentition unchanged  Vital Signs Stable: post-procedure vital signs reviewed and stable  Report to RN Given: handoff report given  Patient transferred to: Phase II    Handoff Report: Identifed the Patient, Identified the Reponsible Provider, Reviewed the pertinent medical history, Discussed the surgical course, Reviewed Intra-OP anesthesia mangement and issues during anesthesia, Set expectations for post-procedure period and Allowed opportunity for questions and acknowledgement of understanding      Vitals: (Last set prior to Anesthesia Care Transfer)  CRNA VITALS  5/7/2021 1058 - 5/7/2021 1128      5/7/2021             Pulse:  69    SpO2:  99 %        Electronically Signed By: JAQUI Crowell CRNA  May 7, 2021  11:28 AM

## 2021-05-07 NOTE — BRIEF OP NOTE
[facility]   Brief Operative Note    Pre-operative diagnosis: Dysphagia [R13.10]  GERD without esophagitis [K21.9]   Post-operative diagnosis healing esophagitis, otherwise normal     Procedure: Procedure(s):  ESOPHAGOGASTRODUODENOSCOPY, WITH BIOPSY   Surgeon(s): Surgeon(s) and Role:     * Jhonatan Santacruz MD - Primary   Estimated blood loss: * No values recorded between 5/7/2021 11:12 AM and 5/7/2021 11:24 AM *    Specimens: ID Type Source Tests Collected by Time Destination   A : antrum biopsy Tissue Stomach, Antrum SURGICAL PATHOLOGY EXAM Jhonatan Santacruz MD 5/7/2021 11:13 AM    B : Distal esophagus biopsy at 35cm Biopsy Esophagus, Distal SURGICAL PATHOLOGY EXAM Jhonatan Santacruz MD 5/7/2021 11:18 AM       Findings: 1. Normal oropharynx  2. Esophagus normal  3. Evidence of esophagitis and healing ulcers at GE junction - biopsied  4. Stomach normal - biopsied for h. Pylori  5. Duodenum normal

## 2021-05-07 NOTE — ANESTHESIA POSTPROCEDURE EVALUATION
Patient: Gutierrez Orta    Procedure(s):  ESOPHAGOGASTRODUODENOSCOPY, WITH BIOPSY    Diagnosis:Dysphagia [R13.10]  GERD without esophagitis [K21.9]  Diagnosis Additional Information: No value filed.    Anesthesia Type:  General    Note:  Disposition: Outpatient   Postop Pain Control: Uneventful            Sign Out: Well controlled pain   PONV: No   Neuro/Psych: Uneventful            Sign Out: Acceptable/Baseline neuro status   Airway/Respiratory: Uneventful            Sign Out: Acceptable/Baseline resp. status   CV/Hemodynamics: Uneventful            Sign Out: Acceptable CV status; No obvious hypovolemia; No obvious fluid overload   Other NRE: NONE   DID A NON-ROUTINE EVENT OCCUR? No           Last vitals:  Vitals:    05/07/21 1004   BP: 116/81   Pulse: 71   Resp: 16   Temp: 36.5  C (97.7  F)   SpO2: 96%       Last vitals prior to Anesthesia Care Transfer:  CRNA VITALS  5/7/2021 1058 - 5/7/2021 1128      5/7/2021             Pulse:  69    SpO2:  99 %          Electronically Signed By: JAQUI Crowell CRNA  May 7, 2021  11:28 AM

## 2021-05-07 NOTE — H&P
55 year old year old male here for upper endoscopy for evaluation of chronic GERD symptoms.        Patient Active Problem List   Diagnosis     Allergic rhinitis     GERD     Hyperlipidemia LDL goal <130       Past Medical History:   Diagnosis Date     HAY FEVER        Past Surgical History:   Procedure Laterality Date     COLONOSCOPY N/A 12/29/2016    Procedure: COLONOSCOPY;  Surgeon: Raghu Mishra MD;  Location: [facility]     SURGICAL HISTORY OF -       Septoplasty       Family History   Problem Relation Age of Onset     Cancer Father         Lung-smoker     Neurologic Disorder Paternal Grandfather         parkinsons     C.A.D. Maternal Grandfather         CAB in his 50s     Respiratory Maternal Grandfather         Emphysemia     Prostate Cancer No family hx of      Colon Cancer No family hx of        No current outpatient medications on file.       No Known Allergies    Pt reports that he has never smoked. He has never used smokeless tobacco. He reports current alcohol use. He reports that he does not use drugs.    Exam:    Awake, Alert OX3  Lungs - CTA bilaterally  CV - RRR, no murmurs, distal pulses intact  Abd - soft, non-distended, non-tender, +BS  Extr - No cyanosis or edema    A/P 55 year old year old male in need of upper endoscopy for evaluation of chronic GERD symptoms. Risks, benefits, alternatives, and complications were discussed including the possibility of perforation and the patient agreed to proceed.    Jhonatan Santacruz MD

## 2021-05-11 LAB — COPATH REPORT: NORMAL

## 2021-05-29 ENCOUNTER — HEALTH MAINTENANCE LETTER (OUTPATIENT)
Age: 56
End: 2021-05-29

## 2021-09-12 ENCOUNTER — HEALTH MAINTENANCE LETTER (OUTPATIENT)
Age: 56
End: 2021-09-12

## 2021-10-08 DIAGNOSIS — E78.5 HYPERLIPIDEMIA LDL GOAL <130: ICD-10-CM

## 2021-10-11 NOTE — TELEPHONE ENCOUNTER
"Requested Prescriptions   Pending Prescriptions Disp Refills     atorvastatin (LIPITOR) 20 MG tablet [Pharmacy Med Name: ATORVASTATIN CALCIUM 20MG TABS] 90 tablet 0     Sig: TAKE ONE TABLET BY MOUTH ONCE DAILY **DUE FOR YEARLY LABS NOW**       Statins Protocol Failed - 10/8/2021  4:01 PM        Failed - LDL on file in past 12 months     Recent Labs   Lab Test 12/02/19  1617   LDL 74             Passed - No abnormal creatine kinase in past 12 months     No lab results found.             Passed - Recent (12 mo) or future (30 days) visit within the authorizing provider's specialty     Patient has had an office visit with the authorizing provider or a provider within the authorizing providers department within the previous 12 mos or has a future within next 30 days. See \"Patient Info\" tab in inbasket, or \"Choose Columns\" in Meds & Orders section of the refill encounter.              Passed - Medication is active on med list        Passed - Patient is age 18 or older             "

## 2021-10-12 RX ORDER — ATORVASTATIN CALCIUM 20 MG/1
TABLET, FILM COATED ORAL
Qty: 30 TABLET | Refills: 0 | Status: SHIPPED | OUTPATIENT
Start: 2021-10-12 | End: 2021-12-22

## 2021-12-20 DIAGNOSIS — E78.5 HYPERLIPIDEMIA LDL GOAL <130: ICD-10-CM

## 2021-12-22 RX ORDER — ATORVASTATIN CALCIUM 20 MG/1
20 TABLET, FILM COATED ORAL DAILY
Qty: 30 TABLET | Refills: 0 | Status: SHIPPED | OUTPATIENT
Start: 2021-12-22 | End: 2022-03-11

## 2022-03-03 DIAGNOSIS — E78.5 HYPERLIPIDEMIA LDL GOAL <130: ICD-10-CM

## 2022-03-03 NOTE — LETTER
[facility]  [address] [place] [id number]  Phone: [phone]  Fax: [phone]       March 11, 2022         Gutierrez Orta  [address] [place] [id number]            Dear Gutierrez:    We are concerned about your health care.  We recently provided you with medication refills.  Many medications require routine follow-up with your doctor.    Your prescription(s) have been refilled for atrovastatin so you may have time for the above noted follow-up. Please call to schedule soon so we can assure you have an appointment before your next refills are needed.    Thank you,      Shilo May MD/ Bibi Miller RN

## 2022-03-11 RX ORDER — ATORVASTATIN CALCIUM 20 MG/1
TABLET, FILM COATED ORAL
Qty: 30 TABLET | Refills: 0 | Status: SHIPPED | OUTPATIENT
Start: 2022-03-11 | End: 2022-11-04

## 2022-03-11 NOTE — TELEPHONE ENCOUNTER
BMI Within normal limits, continue current management. No answer on phone.   My Chart and letter sent stating needs appointment with the doctor for further refill.

## 2022-03-11 NOTE — TELEPHONE ENCOUNTER
"Requested Prescriptions   Pending Prescriptions Disp Refills     atorvastatin (LIPITOR) 20 MG tablet [Pharmacy Med Name: ATORVASTATIN CALCIUM 20MG TABS] 30 tablet 0     Sig: TAKE 1 TABLET (20 MG) BY MOUTH DAILY (ONE MONTH ONLY NEEDS APPOINTMENT AND LABS)       Statins Protocol Failed - 3/9/2022 11:06 AM        Failed - LDL on file in past 12 months     Recent Labs   Lab Test 12/02/19  1617   LDL 74             Passed - No abnormal creatine kinase in past 12 months     No lab results found.             Passed - Recent (12 mo) or future (30 days) visit within the authorizing provider's specialty     Patient has had an office visit with the authorizing provider or a provider within the authorizing providers department within the previous 12 mos or has a future within next 30 days. See \"Patient Info\" tab in inbasket, or \"Choose Columns\" in Meds & Orders section of the refill encounter.              Passed - Medication is active on med list        Passed - Patient is age 18 or older             "

## 2022-06-19 ENCOUNTER — HEALTH MAINTENANCE LETTER (OUTPATIENT)
Age: 57
End: 2022-06-19

## 2022-06-20 ENCOUNTER — TRANSFERRED RECORDS (OUTPATIENT)
Dept: HEALTH INFORMATION MANAGEMENT | Facility: CLINIC | Age: 57
End: 2022-06-20

## 2022-08-24 ENCOUNTER — LAB (OUTPATIENT)
Dept: FAMILY MEDICINE | Facility: CLINIC | Age: 57
End: 2022-08-24
Payer: COMMERCIAL

## 2022-08-24 DIAGNOSIS — Z20.822 SUSPECTED COVID-19 VIRUS INFECTION: ICD-10-CM

## 2022-08-24 PROCEDURE — 99207 PR NO CHARGE LOS: CPT

## 2022-08-24 PROCEDURE — U0003 INFECTIOUS AGENT DETECTION BY NUCLEIC ACID (DNA OR RNA); SEVERE ACUTE RESPIRATORY SYNDROME CORONAVIRUS 2 (SARS-COV-2) (CORONAVIRUS DISEASE [COVID-19]), AMPLIFIED PROBE TECHNIQUE, MAKING USE OF HIGH THROUGHPUT TECHNOLOGIES AS DESCRIBED BY CMS-2020-01-R: HCPCS

## 2022-08-24 PROCEDURE — U0005 INFEC AGEN DETEC AMPLI PROBE: HCPCS

## 2022-08-25 ENCOUNTER — TELEPHONE (OUTPATIENT)
Dept: NURSING | Facility: CLINIC | Age: 57
End: 2022-08-25

## 2022-08-25 LAB — SARS-COV-2 RNA RESP QL NAA+PROBE: POSITIVE

## 2022-08-25 NOTE — TELEPHONE ENCOUNTER
Coronavirus (COVID-19) Notification    Caller Name (Patient, parent, daughter/son, grandparent, etc)  Patient    Reason for call  Notify of Positive Coronavirus (COVID-19) lab results, assess symptoms,  review [facility] recommendations    Lab Result    Lab test:  2019-nCoV rRt-PCR or SARS-CoV-2 PCR    Oropharyngeal AND/OR nasopharyngeal swabs is POSITIVE for 2019-nCoV RNA/SARS-COV-2 PCR (COVID-19 virus)      Gather patient reported symptoms   Assessment   Current Symptoms at time of phone call, reported by patient: (if no symptoms, document: No symptoms] Fever,  sweats   Date of symptom(s) onset (if applicable) 8/18     If at time of call, Patients symptoms have worsened, the Patient should contact 911 or have someone drive them to Emergency Dept promptly:      If Patient calling 911, inform 911 personal that you have tested positive for the Coronavirus (COVID-19).  Place mask on and await 911 to arrive.    If Emergency Dept, If possible, please have another adult drive you to the Emergency Dept but you need to wear mask when in contact with other people.      Treatment Options:   Patient classified as COVID treatment eligible by Epic high risk algorithm: No  You may be eligible to receive a new treatment with a monoclonal antibody for preventing hospitalization in patients at high risk for complications from COVID-19.  This medication is still experimental and available on a limited basis; it is given through an IV and must be given at an infusion center.  Please note that not all people who are eligible will receive the medication since it is in limited supply.   Is the patient symptomatic and onset of symptoms within the last 7 days? No. Patient does not qualify.    Review information with Patient    Your result was positive. This means you have COVID-19 (coronavirus).    How can I protect others?    These guidelines are for isolating before returning to work, school or .    If you DO have  symptoms    Stay home and away from others     For at least 5 days after your symptoms started, AND    You are fever free for 24 hours (with no medicine that reduces fever), AND    Your other symptoms are better    Wear a mask for 10 full days anytime you are around others    If you DON'T have symptoms    Stay home and away from others for at least 5 days after your positive test    Wear a mask for 10 full days anytime you are around others    There may be different guidelines for healthcare facilities.  Please check with the specific sites before arriving.    If you have been told by a doctor that you were severely ill with COVID-19 or are immunocompromised, you should isolate for at least 10 days.    You should not go back to work until you meet the guidelines above for ending your home isolation. You don't need to be retested for COVID-19 before going back to work--studies show that you won't spread the virus if it's been at least 10 days since your symptoms started (or 20 days, if you have a weak immune system).    Employers, schools, and daycares: This is an official notice for this person's medical guidelines for returning in-person.  They must meet the above guidelines before going back to work, school or  in person.    You will receive a positive COVID-19 letter via [employer] or the mail soon with additional self-care information.    Would you like me to review some of that information with you now?  No    If you were tested for an upcoming procedure, please contact your provider for next steps.    Smiley Gonzalez

## 2022-11-04 ENCOUNTER — OFFICE VISIT (OUTPATIENT)
Dept: FAMILY MEDICINE | Facility: CLINIC | Age: 57
End: 2022-11-04
Payer: COMMERCIAL

## 2022-11-04 VITALS
HEIGHT: 73 IN | OXYGEN SATURATION: 97 % | TEMPERATURE: 97.7 F | DIASTOLIC BLOOD PRESSURE: 82 MMHG | BODY MASS INDEX: 28.81 KG/M2 | HEART RATE: 71 BPM | RESPIRATION RATE: 16 BRPM | SYSTOLIC BLOOD PRESSURE: 120 MMHG | WEIGHT: 217.4 LBS

## 2022-11-04 DIAGNOSIS — K21.9 GASTROESOPHAGEAL REFLUX DISEASE WITHOUT ESOPHAGITIS: ICD-10-CM

## 2022-11-04 DIAGNOSIS — R79.89 ABNORMAL SERUM CREATININE LEVEL: ICD-10-CM

## 2022-11-04 DIAGNOSIS — M25.511 CHRONIC RIGHT SHOULDER PAIN: ICD-10-CM

## 2022-11-04 DIAGNOSIS — Z00.00 ROUTINE GENERAL MEDICAL EXAMINATION AT A HEALTH CARE FACILITY: Primary | ICD-10-CM

## 2022-11-04 DIAGNOSIS — J30.2 SEASONAL ALLERGIC RHINITIS, UNSPECIFIED TRIGGER: ICD-10-CM

## 2022-11-04 DIAGNOSIS — G89.29 CHRONIC RIGHT SHOULDER PAIN: ICD-10-CM

## 2022-11-04 DIAGNOSIS — E78.5 HYPERLIPIDEMIA LDL GOAL <130: ICD-10-CM

## 2022-11-04 DIAGNOSIS — E78.5 HYPERLIPIDEMIA LDL GOAL <130: Primary | ICD-10-CM

## 2022-11-04 DIAGNOSIS — Z13.1 SCREENING FOR DIABETES MELLITUS: ICD-10-CM

## 2022-11-04 DIAGNOSIS — Z12.5 SCREENING FOR PROSTATE CANCER: ICD-10-CM

## 2022-11-04 LAB
ANION GAP SERPL CALCULATED.3IONS-SCNC: 8 MMOL/L (ref 7–15)
BUN SERPL-MCNC: 17.2 MG/DL (ref 6–20)
CALCIUM SERPL-MCNC: 9.6 MG/DL (ref 8.6–10)
CHLORIDE SERPL-SCNC: 103 MMOL/L (ref 98–107)
CHOLEST SERPL-MCNC: 280 MG/DL
CREAT SERPL-MCNC: 1.22 MG/DL (ref 0.67–1.17)
DEPRECATED HCO3 PLAS-SCNC: 26 MMOL/L (ref 22–29)
GFR SERPL CREATININE-BSD FRML MDRD: 70 ML/MIN/1.73M2
GLUCOSE SERPL-MCNC: 109 MG/DL (ref 70–99)
HDLC SERPL-MCNC: 33 MG/DL
LDLC SERPL CALC-MCNC: 193 MG/DL
NONHDLC SERPL-MCNC: 247 MG/DL
POTASSIUM SERPL-SCNC: 4.4 MMOL/L (ref 3.4–5.3)
PSA SERPL-MCNC: 0.96 NG/ML (ref 0–3.5)
SODIUM SERPL-SCNC: 137 MMOL/L (ref 136–145)
TRIGL SERPL-MCNC: 272 MG/DL

## 2022-11-04 PROCEDURE — 36415 COLL VENOUS BLD VENIPUNCTURE: CPT | Performed by: FAMILY MEDICINE

## 2022-11-04 PROCEDURE — G0103 PSA SCREENING: HCPCS | Performed by: FAMILY MEDICINE

## 2022-11-04 PROCEDURE — 80048 BASIC METABOLIC PNL TOTAL CA: CPT | Performed by: FAMILY MEDICINE

## 2022-11-04 PROCEDURE — 99396 PREV VISIT EST AGE 40-64: CPT | Performed by: FAMILY MEDICINE

## 2022-11-04 PROCEDURE — 80061 LIPID PANEL: CPT | Performed by: FAMILY MEDICINE

## 2022-11-04 RX ORDER — FLUTICASONE PROPIONATE 50 MCG
2 SPRAY, SUSPENSION (ML) NASAL DAILY
Qty: 16 G | Refills: 11 | Status: SHIPPED | OUTPATIENT
Start: 2022-11-04

## 2022-11-04 RX ORDER — ATORVASTATIN CALCIUM 20 MG/1
20 TABLET, FILM COATED ORAL DAILY
Qty: 90 TABLET | Refills: 3 | Status: SHIPPED | OUTPATIENT
Start: 2022-11-04 | End: 2024-01-08

## 2022-11-04 ASSESSMENT — ENCOUNTER SYMPTOMS
HEADACHES: 0
PARESTHESIAS: 0
DYSURIA: 0
HEMATURIA: 0
FREQUENCY: 0
CONSTIPATION: 0
MYALGIAS: 0
NAUSEA: 0
FEVER: 0
SHORTNESS OF BREATH: 0
EYE PAIN: 0
ABDOMINAL PAIN: 0
COUGH: 0
JOINT SWELLING: 0
CHILLS: 0
WEAKNESS: 0
SORE THROAT: 0
PALPITATIONS: 0
NERVOUS/ANXIOUS: 0
DIARRHEA: 0
HEMATOCHEZIA: 0
ARTHRALGIAS: 1
DIZZINESS: 0
HEARTBURN: 0

## 2022-11-04 ASSESSMENT — PAIN SCALES - GENERAL: PAINLEVEL: NO PAIN (0)

## 2022-11-04 NOTE — PROGRESS NOTES
SUBJECTIVE:   CC: Gutierrez is an 56 year old who presents for preventative health visit.   Chief Complaint   Patient presents with     Physical     Lipids     Musculoskeletal Problem     Right shoulder pain off and on for about 10 years; has been getting worse x4-5 months; overuse and range of motion makes it worse; has not tried heat, ice, tylenol or ibuprofen for it. Aches most of the time; sharp pain when moved      Last clinic visit: 4/30/21 for GERD and dysphagia.  He had gastroscopy.  Esophagitis.     Pain right shoulder.   Aggravating factors: reaching back, throwing and quick movements with sharp pain  Onset of symptoms: years.  Worse this summer.  He noted casting when fishing.   Alleviating factors: has tried aspirin.   No other joints are affected.     Patient has been advised of split billing requirements and indicates understanding: Yes     Healthy Habits:     Getting at least 3 servings of Calcium per day:  NO    Bi-annual eye exam:  Yes    Dental care twice a year:  Yes    Sleep apnea or symptoms of sleep apnea:  None    Diet:  Regular (no restrictions)    Frequency of exercise:  None    Taking medications regularly:  Yes    Medication side effects:  None    PHQ-2 Total Score: 0    Additional concerns today:  Yes  Exercise:none.  Runner in the past.  Some walking.  Active in yard.      Hyperlipidemia Follow-Up    Are you regularly taking any medication or supplement to lower your cholesterol?   NO - prescription ran out in March, hasn't taken since.      Are you having muscle aches or other side effects that you think could be caused by your cholesterol lowering medication?  No    Stomach has been doing well.   He has GERD.    No longer on omeprazole.  Quit a long time ago.     Today's PHQ-2 Score:   PHQ-2 ( 1999 Pfizer) 11/4/2022   Q1: Little interest or pleasure in doing things 0   Q2: Feeling down, depressed or hopeless 0   PHQ-2 Score 0   PHQ-2 Total Score (12-17 Years)- Positive if 3 or more points;  Administer PHQ-A if positive -   Q1: Little interest or pleasure in doing things Not at all   Q2: Feeling down, depressed or hopeless Not at all   PHQ-2 Score 0       Abuse: Current or Past(Physical, Sexual or Emotional)- No  Do you feel safe in your environment? Yes    Have you ever done Advance Care Planning? (For example, a Health Directive, POLST, or a discussion with a medical provider or your loved ones about your wishes): No, advance care planning information given to patient to review.  Patient plans to discuss their wishes with loved ones or provider.      Social History     Tobacco Use     Smoking status: Never     Smokeless tobacco: Never   Substance Use Topics     Alcohol use: Yes     Comment: social   alcohol: once a month.   If you drink alcohol do you typically have >3 drinks per day or >7 drinks per week? Not applicable    Alcohol Use 2022   Prescreen: >3 drinks/day or >7 drinks/week? Not Applicable   Prescreen: >3 drinks/day or >7 drinks/week? -     Last PSA:   PSA   Date Value Ref Range Status   2019 0.44 0 - 4 ug/L Final     Comment:     Assay Method:  Chemiluminescence using Siemens Vista analyzer     Patient Active Problem List    Diagnosis Date Noted     Hyperlipidemia LDL goal <130 2013     Priority: Medium     GERD 10/18/2006     Priority: Medium     Allergic rhinitis 10/27/2005     Priority: Medium     Problem list name updated by automated process. Provider to review        Family history:  CV disease: MGM  Prostate cancer: no  Colon cancer: no    Multivitamin or Vit D use: no    Vaccines:current tetanus.  Has had COVID-19 virus and one booster.      Past Colon cancer screenin    Review of Systems   Constitutional: Negative for chills and fever.   HENT: Negative for congestion, ear pain, hearing loss and sore throat.    Eyes: Negative for pain and visual disturbance.   Respiratory: Negative for cough and shortness of breath.    Cardiovascular: Negative for chest pain,  "palpitations and peripheral edema.   Gastrointestinal: Negative for abdominal pain, constipation, diarrhea, heartburn, hematochezia and nausea.   Genitourinary: Negative for dysuria, frequency, genital sores, hematuria, impotence, penile discharge and urgency.   Musculoskeletal: Positive for arthralgias. Negative for joint swelling and myalgias.   Skin: Negative for rash.   Neurological: Negative for dizziness, weakness, headaches and paresthesias.   Psychiatric/Behavioral: Negative for mood changes. The patient is not nervous/anxious.      OBJECTIVE:                                                    OBJECTIVE:Blood pressure 120/82, pulse 71, temperature 97.7  F (36.5  C), temperature source Tympanic, resp. rate 16, height 1.854 m (6' 1\"), weight 98.6 kg (217 lb 6.4 oz), SpO2 97 %. BMI=Body mass index is 28.68 kg/m .  GENERAL APPEARANCE ADULT: Alert, no acute distress  EYES: PERRL, EOM normal, conjunctiva and lids normal  HENT: Ears and TMs normal, oral mucosa and posterior oropharynx normal  NECK: No adenopathy,masses or thyromegaly  RESP: lungs clear to auscultation   CV: normal rate, regular rhythm, no murmur or gallop  ABDOMEN: soft, no organomegaly, masses or tenderness   (male): declined  MS: extremities normal, no peripheral edema  shoulder exam: appearance normal, range of motion normal, impingement signs present with abnormal empty can test, strength in abduction normal, non-tender    ASSESSMENT/PLAN:                                                    Lifestyle recommendations:regular exercise, at least 150 minutes per week (average 30 minutes 5 times a week)  keep working on losing weight (ideal BMI-body mass index is <25)  The following exams/tests were recommended and discussed for health maintenance:  Colon cancer screening recommended 2026  Prostate cancer screening is optional starting at age 50 if normal risk, age 40 or 45 for high risk men (strong family history or blacks.)  Screening can include " digital rectal exam and PSA blood test.     (Z00.00) Routine general medical examination at a health care facility  (primary encounter diagnosis)    (E78.5) Hyperlipidemia LDL goal <130  Comment: off atorvastatin   Plan: Fasting blood  Tests today.   Resume atorvastatin depending upon cholesterol results.     (K21.9) Gastroesophageal reflux disease without esophagitis  Comment: doing well with no medication   Plan: No change in current treatment plan.     (M25.511,  G89.29) Chronic right shoulder pain  Comment: wear and tear changes in soft tissues of shoulder.  Good range of motion.   Plan: Discussed options for treatment of shoulder pain which have some proven effectiveness including, appropriate doses of an anti-inflammatory medication like ibuprofen or naproxen.  Typicaly these are taken on a regular basis for a couple weeks.   Physical therapy is another way to improve shoulder pain and function.  Call or sent [employer] message if you would like referral.   Recheck if he is having persistent pain. Corticosteroid injection may be an option as well if not improving.      Ibuprofen 200mg OTC may be taken up to 4 pills 4 times a day to the maximum of 3200mg or 16 pills daily as needed for pain.   Take with food.  Don't take with aspirin, Aleve or other antiinflammatory medication or with warfarin.     Naproxen (Aleve) OTC may be taken up to 2 pills 2 times a day to the maximum of 4 pills daily as needed for pain.   Take with food.  Don't take with aspirin, ibuprofen or other antiinflammatory medication or with warfarin.     Refills on Flonase.     Patient has been advised of split billing requirements and indicates understanding: Yes      COUNSELING:   Reviewed preventive health counseling, as reflected in patient instructions  Special attention given to:        Regular exercise       Colorectal cancer screening       Prostate cancer screening    Estimated body mass index is 28.68 kg/m  as calculated from the  "following:    Height as of this encounter: 1.854 m (6' 1\").    Weight as of this encounter: 98.6 kg (217 lb 6.4 oz).     Weight management plan: Discussed healthy diet and exercise guidelines    He reports that he has never smoked. He has never used smokeless tobacco.      Counseling Resources:  ATP IV Guidelines  Pooled Cohorts Equation Calculator  FRAX Risk Assessment  ICSI Preventive Guidelines  Dietary Guidelines for Americans, 2010  USDA's MyPlate  ASA Prophylaxis  Lung CA Screening    Shilo May MD  [facility]  "

## 2022-11-04 NOTE — RESULT ENCOUNTER NOTE
"ANGY Whitley,  The creatinine, a blood test to measure kidney function is elevated indicating some decrease in kidney function.  Glucose mildly increased in pre-diabetes range.   LDL (\"bad cholesterol\") is high.  This increases risk for cardiovascular disease (heart attacks and strokes)..  HDL (\"good cholesterol\") is low.  .  Triglycerides, a type of fat found in the bloodstream is high.   PSA test (for prostate cancer screening) is normal.   PLAN: I do not know why the kidney test is abnormal.  I do not believe we have checked this in the past.  It could be an indicator of some chronic kidney disease.  Your bad cholesterol is very high.  I do recommend starting back on the cholesterol-lowering medicine.  You are doing well on the atorvastatin 20 mg daily pills.  I recommend restarting that.  Recheck a lipid panel and another kidney test in about 2 months.  You can make an appointment with our  for a lab appointment at that time.  If the kidney test remains abnormal, I will recommend a kidney ultrasound and a urinalysis.  ORLANDO ROWE MD "

## 2022-11-04 NOTE — PATIENT INSTRUCTIONS
ASSESSMENT/PLAN:                                                    Lifestyle recommendations:regular exercise, at least 150 minutes per week (average 30 minutes 5 times a week)  keep working on losing weight (ideal BMI-body mass index is <25)  The following exams/tests were recommended and discussed for health maintenance:  Colon cancer screening recommended 2026  Prostate cancer screening is optional starting at age 50 if normal risk, age 40 or 45 for high risk men (strong family history or blacks.)  Screening can include digital rectal exam and PSA blood test.     (Z00.00) Routine general medical examination at a health care facility  (primary encounter diagnosis)    (E78.5) Hyperlipidemia LDL goal <130  Comment: off atorvastatin   Plan: Fasting blood  Tests today.   Resume atorvastatin depending upon cholesterol results.     (K21.9) Gastroesophageal reflux disease without esophagitis  Comment: doing well with no medication   Plan: No change in current treatment plan.     (M25.511,  G89.29) Chronic right shoulder pain  Comment: wear and tear changes in soft tissues of shoulder.  Good range of motion.   Plan: Discussed options for treatment of shoulder pain which have some proven effectiveness including, appropriate doses of an anti-inflammatory medication like ibuprofen or naproxen.  Typicaly these are taken on a regular basis for a couple weeks.   Physical therapy is another way to improve shoulder pain and function.  Call or sent [employer] message if you would like referral.   Recheck if he is having persistent pain. Corticosteroid injection may be an option as well if not improving.      Ibuprofen 200mg OTC may be taken up to 4 pills 4 times a day to the maximum of 3200mg or 16 pills daily as needed for pain.   Take with food.  Don't take with aspirin, Aleve or other antiinflammatory medication or with warfarin.     Naproxen (Aleve) OTC may be taken up to 2 pills 2 times a day to the maximum of 4 pills daily as  needed for pain.   Take with food.  Don't take with aspirin, ibuprofen or other antiinflammatory medication or with warfarin.     Refills on Flonase.       Preventive Health Recommendations  Male Ages 50 - 64    Yearly exam:             See your health care provider every year in order to  o   Review health changes.   o   Discuss preventive care.    o   Review your medicines if your doctor has prescribed any.   Have a cholesterol test every 5 years, or more frequently if you are at risk for high cholesterol/heart disease.   Have a diabetes test (fasting glucose) every three years. If you are at risk for diabetes, you should have this test more often.   Have a colonoscopy at age 50, or have a yearly FIT test (stool test). These exams will check for colon cancer.    Talk with your health care provider about whether or not a prostate cancer screening test (PSA) is right for you.  You should be tested each year for STDs (sexually transmitted diseases), if you re at risk.     Shots: Get a flu shot each year. Get a tetanus shot every 10 years.     Nutrition:  Eat at least 5 servings of fruits and vegetables daily.   Eat whole-grain bread, whole-wheat pasta and brown rice instead of white grains and rice.   Get adequate Calcium and Vitamin D.     Lifestyle  Exercise for at least 150 minutes a week (30 minutes a day, 5 days a week). This will help you control your weight and prevent disease.   Limit alcohol to one drink per day.   No smoking.   Wear sunscreen to prevent skin cancer.   See your dentist every six months for an exam and cleaning.   See your eye doctor every 1 to 2 years.

## 2022-11-16 ENCOUNTER — TELEPHONE (OUTPATIENT)
Dept: FAMILY MEDICINE | Facility: CLINIC | Age: 57
End: 2022-11-16

## 2022-11-16 DIAGNOSIS — R79.89 ELEVATED SERUM CREATININE: Primary | ICD-10-CM

## 2022-11-16 NOTE — TELEPHONE ENCOUNTER
Per result note from 11/04/22 recheck kidney test and if remain abnormal do kidney ultrasound and UA. See previous message.  Tashia VALDES RN

## 2022-11-16 NOTE — TELEPHONE ENCOUNTER
Order/Referral Request    Who is requesting: Patient    Orders being requested: Ultrasound and Urinalysis    Reason service is needed/diagnosis: Patient came to clinic to talk with Dr. May. Patient stated he wants to go a head with the ultrasound and urinalysis. Patient did bring in lab work from 6 months ago. Labs were placed in providers yellow folder to look at.     When are orders needed by: ASAP    Has this been discussed with Provider: Yes    Could we send this information to you in [employer] or would you prefer to receive a phone call?:   Patient would prefer a phone call   Okay to leave a detailed message?: Yes at [phone]

## 2022-11-17 ENCOUNTER — LAB (OUTPATIENT)
Dept: LAB | Facility: CLINIC | Age: 57
End: 2022-11-17
Payer: COMMERCIAL

## 2022-11-17 DIAGNOSIS — R79.89 ELEVATED SERUM CREATININE: ICD-10-CM

## 2022-11-17 LAB
ALBUMIN UR-MCNC: NEGATIVE MG/DL
APPEARANCE UR: CLEAR
BILIRUB UR QL STRIP: NEGATIVE
COLOR UR AUTO: YELLOW
CREAT UR-MCNC: 251.9 MG/DL
GLUCOSE UR STRIP-MCNC: NEGATIVE MG/DL
HGB UR QL STRIP: ABNORMAL
KETONES UR STRIP-MCNC: NEGATIVE MG/DL
LEUKOCYTE ESTERASE UR QL STRIP: NEGATIVE
MICROALBUMIN UR-MCNC: <12 MG/L
MICROALBUMIN/CREAT UR: NORMAL MG/G{CREAT}
MUCOUS THREADS #/AREA URNS LPF: PRESENT /LPF
NITRATE UR QL: NEGATIVE
PH UR STRIP: 5.5 [PH] (ref 5–7)
RBC #/AREA URNS AUTO: ABNORMAL /HPF
SP GR UR STRIP: >=1.03 (ref 1–1.03)
UROBILINOGEN UR STRIP-ACNC: 0.2 E.U./DL
WBC #/AREA URNS AUTO: ABNORMAL /HPF

## 2022-11-17 PROCEDURE — 81001 URINALYSIS AUTO W/SCOPE: CPT

## 2022-11-17 PROCEDURE — 82043 UR ALBUMIN QUANTITATIVE: CPT

## 2022-11-17 NOTE — TELEPHONE ENCOUNTER
Please call.  Schedule an appointment with diagnostics for renal ultrasound.   Call [phone] to schedule.    Schedule an appointment with our  for a lab appointment.   Recheck blood test and two urine tests.    I will send him a MyChart report on results when available.  Drink adequate fluids prior to urine and blood tests.     He had a recent physical exam on November 4.  His serum creatinine was 1.22 (normal up to 1.17) and GFR estimated to be 70.  This has not been done before through our clinic.  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  His creatinine was 1.36.  His BUN was 13.  GFR I believe was not calculated.  Her LDH was 236 normal up to 224.  Total cholesterol 276, triglycerides equal to 25, HDL equals 31, LDL equals 201.    PLAN: Two tests this year with elevated creatinine.  This suggests chronic kidney disease.  PLAN: I recommend he comes in for UA and MAC, repeat chem8.    Also schedule renal ultrasound.  All above to evaluate for chronic kidney disease and causes.   ORLANDO ROWE MD

## 2022-11-17 NOTE — TELEPHONE ENCOUNTER
Pt informed/ advised as noted per MD. Have number to schedule US, lab appt scheduled today.  KERMIT Marte RN

## 2022-11-18 NOTE — RESULT ENCOUNTER NOTE
Rodriguez Whitley,  Your urine tests look normal.   Urine test for protein pending.   ORLANDO ROWE MD

## 2022-11-18 NOTE — RESULT ENCOUNTER NOTE
Rodriguez Whitley,  Microalbumin urine test is normal indicating no increased amount of protein in urine. This is a measure of kidney function.    ORLANDO ROWE MD

## 2022-11-19 ENCOUNTER — HEALTH MAINTENANCE LETTER (OUTPATIENT)
Age: 57
End: 2022-11-19

## 2022-11-21 ENCOUNTER — HOSPITAL ENCOUNTER (OUTPATIENT)
Dept: ULTRASOUND IMAGING | Facility: CLINIC | Age: 57
Discharge: HOME | End: 2022-11-21
Attending: FAMILY MEDICINE | Admitting: FAMILY MEDICINE
Payer: COMMERCIAL

## 2022-11-21 DIAGNOSIS — R79.89 ELEVATED SERUM CREATININE: ICD-10-CM

## 2022-11-21 PROBLEM — E79.0 ELEVATED URIC ACID IN BLOOD: Status: ACTIVE | Noted: 2022-11-21

## 2022-11-21 PROCEDURE — 76770 US EXAM ABDO BACK WALL COMP: CPT

## 2022-11-21 NOTE — RESULT ENCOUNTER NOTE
Marianela contact patient with results    Gutierrez  I am reviewing your results in Dr May's absence.  Continue your atorvastatin for cholesterol.  I note that your uric acid is high.  This puts you at risk for gout.  You may wish to make an appointment to discuss gout prevention.  Michelle Goldstein PA-C

## 2022-11-22 DIAGNOSIS — N28.89 CALIECTASIS DETERMINED BY ULTRASOUND OF KIDNEY: Primary | ICD-10-CM

## 2022-11-22 DIAGNOSIS — R79.89 ABNORMAL SERUM CREATININE LEVEL: ICD-10-CM

## 2022-11-22 NOTE — RESULT ENCOUNTER NOTE
Rodriguez Whitley,  There is mild enlargement of urine collecting areas of both kidneys.  This may be connected to your chronic kidney disease.   PLAN: I recommend a consultation with one of the urologists to see if any additional testing or treatment is needed.     [employer] will call you to coordinate care as prescribed your provider. If you don't hear from a representative within 2 business days, please call [phone].  ORLANDO ROWE MD

## 2023-01-02 ENCOUNTER — OFFICE VISIT (OUTPATIENT)
Dept: UROLOGY | Facility: CLINIC | Age: 58
End: 2023-01-02
Attending: FAMILY MEDICINE
Payer: COMMERCIAL

## 2023-01-02 VITALS — HEART RATE: 78 BPM | SYSTOLIC BLOOD PRESSURE: 124 MMHG | OXYGEN SATURATION: 98 % | DIASTOLIC BLOOD PRESSURE: 85 MMHG

## 2023-01-02 DIAGNOSIS — N28.89 CALIECTASIS DETERMINED BY ULTRASOUND OF KIDNEY: ICD-10-CM

## 2023-01-02 DIAGNOSIS — R79.89 ABNORMAL SERUM CREATININE LEVEL: ICD-10-CM

## 2023-01-02 PROCEDURE — 99203 OFFICE O/P NEW LOW 30 MIN: CPT | Performed by: STUDENT IN AN ORGANIZED HEALTH CARE EDUCATION/TRAINING PROGRAM

## 2023-01-02 RX ORDER — TAMSULOSIN HYDROCHLORIDE 0.4 MG/1
0.4 CAPSULE ORAL DAILY
Qty: 90 CAPSULE | Refills: 1 | Status: SHIPPED | OUTPATIENT
Start: 2023-01-02 | End: 2024-01-23

## 2023-01-02 ASSESSMENT — PAIN SCALES - GENERAL: PAINLEVEL: NO PAIN (0)

## 2023-01-02 NOTE — NURSING NOTE
Active order to obtain bladder scan? Yes   Name of ordering provider:  Patricia Workman  Bladder scan preformed post void Yes.  Bladder scan reveled 35ML  Provider notified?  Yes    Emi Neal CMA

## 2023-01-02 NOTE — PROGRESS NOTES
Chief Complaint:   Bilateral pelvocaliectasis         History of Present Illness:   Gutierrez Orta is a 57 year old male with a history of elevated uric acid, HLD, and GERD who presents for evaluation of bilateral pelvocaliectasis.     Patient was seen by his PCP for a routine examination on 11/04/2022. His creatinine was 1.22 mg/dL. He had a subsequent renal US on 11/21/2022 and was found to have mild bilateral pelvocaliectasis. UA from 11/17/2022 was unremarkable.     His PSA was 0.96 ng/mL on 11/04/2022.     Today, he reports somewhat slow urinary stream and nocturia x 0-1. He denies urinary incontinence, gross hematuria, and dysuria.            Past Medical History:     Past Medical History:   Diagnosis Date     HAY FEVER             Past Surgical History:     Past Surgical History:   Procedure Laterality Date     COLONOSCOPY N/A 12/29/2016    Procedure: COLONOSCOPY;  Surgeon: Raghu Mishra MD;  Location: [facility]     ENT SURGERY       ESOPHAGOSCOPY, GASTROSCOPY, DUODENOSCOPY (EGD), COMBINED N/A 5/7/2021    Procedure: ESOPHAGOGASTRODUODENOSCOPY, WITH BIOPSY;  Surgeon: Jhonatan Santacruz MD;  Location: [facility]     SURGICAL HISTORY OF -       Septoplasty     wisdom teeth              Medications     Current Outpatient Medications   Medication     atorvastatin (LIPITOR) 20 MG tablet     fluticasone (FLONASE) 50 MCG/ACT nasal spray     No current facility-administered medications for this visit.            Allergies:   Patient has no known allergies.         Review of Systems:  From intake questionnaire   Negative 14 system review except as noted on HPI, nurse's note.         Physical Exam:   Patient is a 57 year old  male   Vitals: Blood pressure 124/85, pulse 78, SpO2 98 %.  General Appearance Adult: Alert, no acute distress, oriented.  Lungs: Non-labored breathing.  Heart: No obvious jugular venous distension present.  Neuro: Alert, oriented, speech and mentation normal    PVR: 35 mL      Labs and Pathology:    I  personally reviewed all applicable laboratory data and went over findings with patient  Significant for:     BMP RESULTS:  Recent Labs   Lab Test 11/04/22  0855 12/02/19  1617     --    POTASSIUM 4.4  --    CHLORIDE 103  --    CO2 26  --    ANIONGAP 8  --    * 96   BUN 17.2  --    CR 1.22*  --    GFRESTIMATED 70  --    LAURA 9.6  --        UA RESULTS:   Recent Labs   Lab Test 11/17/22  1607   SG >=1.030   URINEPH 5.5   NITRITE Negative   RBCU 0-2   WBCU 0-5       PSA RESULTS  PSA   Date Value Ref Range Status   12/02/2019 0.44 0 - 4 ug/L Final     Comment:     Assay Method:  Chemiluminescence using Siemens Vista analyzer   10/21/2016 0.40 0 - 4 ug/L Final     Comment:     Assay Method:  Chemiluminescence using Siemens Vista analyzer     Prostate Specific Antigen Screen   Date Value Ref Range Status   11/04/2022 0.96 0.00 - 3.50 ng/mL Final           Imaging:    I personally reviewed all applicable imaging and went over findings with patient.  Significant for:    Results for orders placed or performed during the hospital encounter of 11/21/22   US Renal Complete Non-Vascular    Narrative    US RENAL COMPLETE NON-VASCULAR 11/21/2022 1:48 PM    CLINICAL HISTORY: abnormal serum creatinine, rule out structural renal  problem.; Elevated serum creatinine  TECHNIQUE: Routine Bilateral Renal and Bladder Ultrasound.    COMPARISON: None.    FINDINGS:    Technically challenging examination due to patient inability to follow  breathing instructions as well as body habitus.    RIGHT KIDNEY: 11.0 cm. No masses. Mild pelvocaliectasis.    LEFT KIDNEY: 11.9 cm. No masses. Mild pelvocaliectasis..     BLADDER: Normal.      Impression    IMPRESSION:  1.  Mild bilateral pelvocaliectasis.    GERRY GIBBS MD         SYSTEM ID:  [id number]            Assessment and Plan:     Assessment: 57 year old male seen in evaluation for mild bilateral pelvocaliectasis noted on renal US from 11/17/2022. Creatinine on 11/4/2022 was 1.22  mg/dL. He reports some slowing of the urinary stream, but denies other bothersome urinary symptoms.     We discussed the possible relationship with BPH. His PVR today was 35 mL. Will start a trial of tamsulosin 0.4 mg daily and repeat renal US in about two months.     Plan:  1. Start tamsulosin 0.4 mg daily. Side effects reviewed.   2. Renal US in two months. Follow up as indicated by results.     LELAND CAMPBELL PA-C  Department of Urology

## 2023-01-02 NOTE — NURSING NOTE
"Initial /85 (BP Location: Right arm, Patient Position: Chair, Cuff Size: Adult Large)   Pulse 78   SpO2 98%  Estimated body mass index is 28.68 kg/m  as calculated from the following:    Height as of 11/4/22: 1.854 m (6' 1\").    Weight as of 11/4/22: 98.6 kg (217 lb 6.4 oz). .    Emi Neal MA on 1/2/2023 at 7:53 AM  "

## 2023-02-15 ENCOUNTER — HOSPITAL ENCOUNTER (OUTPATIENT)
Dept: ULTRASOUND IMAGING | Facility: CLINIC | Age: 58
Discharge: HOME | End: 2023-02-15
Attending: STUDENT IN AN ORGANIZED HEALTH CARE EDUCATION/TRAINING PROGRAM | Admitting: STUDENT IN AN ORGANIZED HEALTH CARE EDUCATION/TRAINING PROGRAM
Payer: COMMERCIAL

## 2023-02-15 DIAGNOSIS — N28.89 CALIECTASIS DETERMINED BY ULTRASOUND OF KIDNEY: ICD-10-CM

## 2023-02-15 PROCEDURE — 76770 US EXAM ABDO BACK WALL COMP: CPT

## 2023-02-23 DIAGNOSIS — N28.89 CALIECTASIS DETERMINED BY ULTRASOUND OF KIDNEY: Primary | ICD-10-CM

## 2023-03-09 ENCOUNTER — HOSPITAL ENCOUNTER (OUTPATIENT)
Dept: NUCLEAR MEDICINE | Facility: CLINIC | Age: 58
Setting detail: NUCLEAR MEDICINE
Discharge: HOME | End: 2023-03-09
Attending: STUDENT IN AN ORGANIZED HEALTH CARE EDUCATION/TRAINING PROGRAM | Admitting: STUDENT IN AN ORGANIZED HEALTH CARE EDUCATION/TRAINING PROGRAM
Payer: COMMERCIAL

## 2023-03-09 DIAGNOSIS — N28.89 CALIECTASIS DETERMINED BY ULTRASOUND OF KIDNEY: ICD-10-CM

## 2023-03-09 PROCEDURE — 250N000011 HC RX IP 250 OP 636: Performed by: STUDENT IN AN ORGANIZED HEALTH CARE EDUCATION/TRAINING PROGRAM

## 2023-03-09 PROCEDURE — 343N000001 HC RX 343: Performed by: STUDENT IN AN ORGANIZED HEALTH CARE EDUCATION/TRAINING PROGRAM

## 2023-03-09 PROCEDURE — 78708 K FLOW/FUNCT IMAGE W/DRUG: CPT

## 2023-03-09 PROCEDURE — A9562 TC99M MERTIATIDE: HCPCS | Performed by: STUDENT IN AN ORGANIZED HEALTH CARE EDUCATION/TRAINING PROGRAM

## 2023-03-09 RX ORDER — FUROSEMIDE 10 MG/ML
20 INJECTION INTRAMUSCULAR; INTRAVENOUS ONCE
Status: COMPLETED | OUTPATIENT
Start: 2023-03-09 | End: 2023-03-09

## 2023-03-09 RX ADMIN — TECHNESCAN TC 99M MERTIATIDE 10 MILLICURIE: 1 INJECTION, POWDER, LYOPHILIZED, FOR SOLUTION INTRAVENOUS at 08:20

## 2023-03-09 RX ADMIN — FUROSEMIDE 20 MG: 10 INJECTION, SOLUTION INTRAMUSCULAR; INTRAVENOUS at 08:40

## 2024-01-05 DIAGNOSIS — E78.5 HYPERLIPIDEMIA LDL GOAL <130: ICD-10-CM

## 2024-01-05 NOTE — TELEPHONE ENCOUNTER
Requested Prescriptions   Pending Prescriptions Disp Refills    atorvastatin (LIPITOR) 20 MG tablet 90 tablet 3     Sig: Take 1 tablet (20 mg) by mouth daily       Statins Protocol Failed - 1/5/2024  2:52 PM        Failed - LDL on file in past 12 months     Recent Labs   Lab Test 11/04/22  0855   *             Failed - Recent (12 mo) or future (30 days) visit within the authorizing provider's specialty     The patient must have completed an in-person or virtual visit within the past 12 months or has a future visit scheduled within the next 90 days with the authorizing provider s specialty.  Urgent care and e-visits do not quality as an office visit for this protocol.          Passed - No abnormal creatine kinase in past 12 months     No lab results found.             Passed - Medication is active on med list        Passed - Patient is age 18 or older

## 2024-01-08 RX ORDER — ATORVASTATIN CALCIUM 20 MG/1
20 TABLET, FILM COATED ORAL DAILY
Qty: 30 TABLET | Refills: 0 | Status: SHIPPED | OUTPATIENT
Start: 2024-01-08 | End: 2024-01-23

## 2024-01-08 RX ORDER — ATORVASTATIN CALCIUM 20 MG/1
20 TABLET, FILM COATED ORAL DAILY
Qty: 90 TABLET | Refills: 3 | OUTPATIENT
Start: 2024-01-08

## 2024-01-08 NOTE — TELEPHONE ENCOUNTER
Needs to establish with new provider, has been more than a year. Can refill to get to appointment    Suicidal ideation with plan/means

## 2024-01-23 ENCOUNTER — OFFICE VISIT (OUTPATIENT)
Dept: FAMILY MEDICINE | Facility: CLINIC | Age: 59
End: 2024-01-23
Payer: COMMERCIAL

## 2024-01-23 VITALS
RESPIRATION RATE: 18 BRPM | WEIGHT: 216 LBS | TEMPERATURE: 98.2 F | DIASTOLIC BLOOD PRESSURE: 84 MMHG | BODY MASS INDEX: 28.63 KG/M2 | SYSTOLIC BLOOD PRESSURE: 130 MMHG | HEART RATE: 77 BPM | OXYGEN SATURATION: 98 % | HEIGHT: 73 IN

## 2024-01-23 DIAGNOSIS — Z13.1 SCREENING FOR DIABETES MELLITUS: ICD-10-CM

## 2024-01-23 DIAGNOSIS — E78.5 HYPERLIPIDEMIA LDL GOAL <130: ICD-10-CM

## 2024-01-23 DIAGNOSIS — Z12.5 SCREENING FOR PROSTATE CANCER: ICD-10-CM

## 2024-01-23 DIAGNOSIS — Z00.00 ROUTINE GENERAL MEDICAL EXAMINATION AT A HEALTH CARE FACILITY: Primary | ICD-10-CM

## 2024-01-23 DIAGNOSIS — N28.9 RENAL IMPAIRMENT: ICD-10-CM

## 2024-01-23 LAB
ANION GAP SERPL CALCULATED.3IONS-SCNC: 11 MMOL/L (ref 7–15)
BUN SERPL-MCNC: 15.6 MG/DL (ref 6–20)
CALCIUM SERPL-MCNC: 9.6 MG/DL (ref 8.6–10)
CHLORIDE SERPL-SCNC: 104 MMOL/L (ref 98–107)
CHOLEST SERPL-MCNC: 180 MG/DL
CREAT SERPL-MCNC: 1.26 MG/DL (ref 0.67–1.17)
DEPRECATED HCO3 PLAS-SCNC: 23 MMOL/L (ref 22–29)
EGFRCR SERPLBLD CKD-EPI 2021: 66 ML/MIN/1.73M2
FASTING STATUS PATIENT QL REPORTED: NO
GLUCOSE SERPL-MCNC: 99 MG/DL (ref 70–99)
HBA1C MFR BLD: 5.5 % (ref 0–5.6)
HDLC SERPL-MCNC: 30 MG/DL
LDLC SERPL CALC-MCNC: 71 MG/DL
NONHDLC SERPL-MCNC: 150 MG/DL
POTASSIUM SERPL-SCNC: 4 MMOL/L (ref 3.4–5.3)
PSA SERPL DL<=0.01 NG/ML-MCNC: 0.62 NG/ML (ref 0–3.5)
SODIUM SERPL-SCNC: 138 MMOL/L (ref 135–145)
TRIGL SERPL-MCNC: 396 MG/DL

## 2024-01-23 PROCEDURE — 99396 PREV VISIT EST AGE 40-64: CPT | Performed by: FAMILY MEDICINE

## 2024-01-23 PROCEDURE — 80061 LIPID PANEL: CPT | Performed by: FAMILY MEDICINE

## 2024-01-23 PROCEDURE — 99213 OFFICE O/P EST LOW 20 MIN: CPT | Mod: 25 | Performed by: FAMILY MEDICINE

## 2024-01-23 PROCEDURE — G0103 PSA SCREENING: HCPCS | Performed by: FAMILY MEDICINE

## 2024-01-23 PROCEDURE — 83036 HEMOGLOBIN GLYCOSYLATED A1C: CPT | Performed by: FAMILY MEDICINE

## 2024-01-23 PROCEDURE — 36415 COLL VENOUS BLD VENIPUNCTURE: CPT | Performed by: FAMILY MEDICINE

## 2024-01-23 PROCEDURE — 80048 BASIC METABOLIC PNL TOTAL CA: CPT | Performed by: FAMILY MEDICINE

## 2024-01-23 RX ORDER — ATORVASTATIN CALCIUM 20 MG/1
20 TABLET, FILM COATED ORAL DAILY
Qty: 90 TABLET | Refills: 3 | Status: SHIPPED | OUTPATIENT
Start: 2024-01-23

## 2024-01-23 ASSESSMENT — ENCOUNTER SYMPTOMS
PALPITATIONS: 0
NAUSEA: 0
JOINT SWELLING: 0
MYALGIAS: 0
SORE THROAT: 0
DIARRHEA: 0
ABDOMINAL PAIN: 0
HEMATURIA: 0
HEADACHES: 0
CHILLS: 0
NERVOUS/ANXIOUS: 0
PARESTHESIAS: 0
SHORTNESS OF BREATH: 0
DYSURIA: 0
EYE PAIN: 0
HEARTBURN: 0
CONSTIPATION: 0
FREQUENCY: 0
FEVER: 0
HEMATOCHEZIA: 0
DIZZINESS: 0
ARTHRALGIAS: 0
WEAKNESS: 0
COUGH: 0

## 2024-01-23 ASSESSMENT — PAIN SCALES - GENERAL: PAINLEVEL: NO PAIN (0)

## 2024-01-23 NOTE — NURSING NOTE
"Chief Complaint   Patient presents with    Physical     /84 (Cuff Size: Adult Large)   Pulse 77   Temp 98.2  F (36.8  C) (Tympanic)   Resp 18   Ht 1.854 m (6' 1\")   Wt 98 kg (216 lb)   SpO2 98%   BMI 28.50 kg/m   Estimated body mass index is 28.5 kg/m  as calculated from the following:    Height as of this encounter: 1.854 m (6' 1\").    Weight as of this encounter: 98 kg (216 lb).  Patient presents to the clinic using No DME      Health Maintenance that is potentially due pending provider review:    Health Maintenance Due   Topic Date Due    HIV SCREENING  Never done    COVID-19 Vaccine (4 - 2023-24 season) 09/01/2023    YEARLY PREVENTIVE VISIT  11/04/2023    ANNUAL REVIEW OF HM ORDERS  11/04/2023                  "

## 2024-01-23 NOTE — PROGRESS NOTES
Preventive Care Visit  [facility]  All Zuniga MD, Family Medicine  Jan 23, 2024      SUBJECTIVE:   Gutierrez is a 58 year old, presenting for the following:  Physical      Healthy Habits:     Getting at least 3 servings of Calcium per day:  Yes    Bi-annual eye exam:  NO    Dental care twice a year:  Yes    Sleep apnea or symptoms of sleep apnea:  None    Diet:  Regular (no restrictions)    Frequency of exercise:  1 day/week    Duration of exercise:  15-30 minutes    Taking medications regularly:  Yes    Medication side effects:  None    Additional concerns today:  No      Today's PHQ-2 Score:       1/23/2024     3:21 PM   PHQ-2 ( 1999 [employer])   Q1: Little interest or pleasure in doing things 0   Q2: Feeling down, depressed or hopeless 0   PHQ-2 Score 0   Q1: Little interest or pleasure in doing things Not at all   Q2: Feeling down, depressed or hopeless Not at all   PHQ-2 Score 0       PROBLEMS TO ADD ON...    Social History     Tobacco Use    Smoking status: Never     Passive exposure: Never    Smokeless tobacco: Never   Substance Use Topics    Alcohol use: Yes     Comment: social             1/23/2024     3:20 PM   Alcohol Use   Prescreen: >3 drinks/day or >7 drinks/week? Not Applicable       Last PSA:   PSA   Date Value Ref Range Status   12/02/2019 0.44 0 - 4 ug/L Final     Comment:     Assay Method:  Chemiluminescence using Siemens Vista analyzer     Prostate Specific Antigen Screen   Date Value Ref Range Status   11/04/2022 0.96 0.00 - 3.50 ng/mL Final       Reviewed orders with patient. Reviewed health maintenance and updated orders accordingly - Yes  Lab work is in process  Labs reviewed in EPIC  BP Readings from Last 3 Encounters:   01/23/24 130/84   01/02/23 124/85   11/04/22 120/82    Wt Readings from Last 3 Encounters:   01/23/24 98 kg (216 lb)   11/04/22 98.6 kg (217 lb 6.4 oz)   04/30/21 99.3 kg (219 lb)                  Patient Active Problem List   Diagnosis    Allergic  rhinitis    GERD    Hyperlipidemia LDL goal <130    Elevated uric acid in blood     Past Surgical History:   Procedure Laterality Date    COLONOSCOPY N/A 12/29/2016    Procedure: COLONOSCOPY;  Surgeon: Raghu Mishra MD;  Location: [facility]    ENT SURGERY      ESOPHAGOSCOPY, GASTROSCOPY, DUODENOSCOPY (EGD), COMBINED N/A 5/7/2021    Procedure: ESOPHAGOGASTRODUODENOSCOPY, WITH BIOPSY;  Surgeon: Jhonatan Santacruz MD;  Location: [facility]    SURGICAL HISTORY OF -       Septoplasty    wisdom teeth         Social History     Tobacco Use    Smoking status: Never     Passive exposure: Never    Smokeless tobacco: Never   Substance Use Topics    Alcohol use: Yes     Comment: social     Family History   Problem Relation Age of Onset    Cancer Father         Lung-smoker    Neurologic Disorder Paternal Grandfather         parkinsons    C.A.D. Maternal Grandfather         CAB in his 50s    Respiratory Maternal Grandfather         Emphysemia    Prostate Cancer No family hx of     Colon Cancer No family hx of          Current Outpatient Medications   Medication Sig Dispense Refill    atorvastatin (LIPITOR) 20 MG tablet Take 1 tablet (20 mg) by mouth daily 30 tablet 0    fluticasone (FLONASE) 50 MCG/ACT nasal spray Spray 2 sprays into both nostrils daily 16 g 11    tamsulosin (FLOMAX) 0.4 MG capsule Take 1 capsule (0.4 mg) by mouth daily (Patient not taking: Reported on 1/23/2024) 90 capsule 1     No Known Allergies  Recent Labs   Lab Test 11/04/22  0855 12/02/19  1617 08/23/18  0743   * 74 123*   HDL 33* 38* 39*   TRIG 272* 305* 135   CR 1.22*  --   --    GFRESTIMATED 70  --   --    POTASSIUM 4.4  --   --         Reviewed and updated as needed this visit by clinical staff   Tobacco  Allergies  Meds              Reviewed and updated as needed this visit by Provider                    Review of Systems   Constitutional:  Negative for chills and fever.   HENT:  Negative for congestion, ear pain, hearing loss and sore throat.    Eyes:  " Negative for pain and visual disturbance.   Respiratory:  Negative for cough and shortness of breath.    Cardiovascular:  Negative for chest pain and palpitations.   Gastrointestinal:  Negative for abdominal pain, constipation, diarrhea and nausea.   Genitourinary:  Negative for dysuria, frequency, genital sores, hematuria, penile discharge and urgency.   Musculoskeletal:  Negative for arthralgias, joint swelling and myalgias.   Skin:  Negative for rash.   Neurological:  Negative for dizziness, weakness and headaches.   Psychiatric/Behavioral:  The patient is not nervous/anxious.        OBJECTIVE:   /84 (Cuff Size: Adult Large)   Pulse 77   Temp 98.2  F (36.8  C) (Tympanic)   Resp 18   Ht 1.854 m (6' 1\")   Wt 98 kg (216 lb)   SpO2 98%   BMI 28.50 kg/m     Estimated body mass index is 28.5 kg/m  as calculated from the following:    Height as of this encounter: 1.854 m (6' 1\").    Weight as of this encounter: 98 kg (216 lb).  Physical Exam  GENERAL: alert and no distress  EYES: Eyes grossly normal to inspection, PERRL and conjunctivae and sclerae normal  HENT: normal cephalic/atraumatic, nose and mouth without ulcers or lesions, oropharynx clear, and oral mucous membranes moist  NECK: no adenopathy, no asymmetry, masses, or scars  RESP: lungs clear to auscultation - no rales, rhonchi or wheezes  CV: regular rates and rhythm, normal S1 S2, no S3 or S4, and no murmur, click or rub  ABDOMEN: soft, nontender  MS: no gross musculoskeletal defects noted, no edema  SKIN: no suspicious lesions or rashes  NEURO: Normal strength and tone, mentation intact and speech normal  PSYCH: mentation appears normal, affect normal/bright      Wt Readings from Last 10 Encounters:   01/23/24 98 kg (216 lb)   11/04/22 98.6 kg (217 lb 6.4 oz)   04/30/21 99.3 kg (219 lb)   09/28/20 99.8 kg (220 lb)   12/23/19 94.3 kg (208 lb)   12/02/19 93 kg (205 lb)   09/22/17 90.7 kg (200 lb)   12/29/16 86.2 kg (190 lb)   10/21/16 85.7 kg (189 " lb)   10/05/15 83 kg (183 lb)        The 10-year ASCVD risk score (Ada HAMMER, et al., 2019) is: 14.3%    Values used to calculate the score:      Age: 58 years      Sex: Male      Is Non- : No      Diabetic: No      Tobacco smoker: No      Systolic Blood Pressure: 130 mmHg      Is BP treated: No      HDL Cholesterol: 33 mg/dL      Total Cholesterol: 280 mg/dL      ASSESSMENT/PLAN:   Routine general medical examination at a health care facility  Medically doing well.  Recommended regular exercise, healthy diet and weight loss.  Lipitor refilled.  - REVIEW OF HEALTH MAINTENANCE PROTOCOL ORDERS  - PRIMARY CARE FOLLOW-UP SCHEDULING; Future    Hyperlipidemia LDL goal <130  Healthy lifestyle modifications stressed including regular exercise, balanced diet, weight loss and limiting salt/caffeine/pop intake.  Lipitor refilled  - atorvastatin (LIPITOR) 20 MG tablet; Take 1 tablet (20 mg) by mouth daily  - Lipid panel; Future    Screening for diabetes mellitus  - Hemoglobin A1c; Future    Screening for prostate cancer  - PSA, screen; Future    Renal impairment  BMP ordered to monitor electrolytes and renal function  - Basic metabolic panel  (Ca, Cl, CO2, Creat, Gluc, K, Na, BUN); Future        Counseling  Reviewed preventive health counseling, as reflected in patient instructions        He reports that he has never smoked. He has never been exposed to tobacco smoke. He has never used smokeless tobacco.            Signed Electronically by: All Zuniga MD

## 2024-06-04 ENCOUNTER — ANCILLARY PROCEDURE (OUTPATIENT)
Dept: GENERAL RADIOLOGY | Facility: CLINIC | Age: 59
End: 2024-06-04
Attending: FAMILY MEDICINE
Payer: COMMERCIAL

## 2024-06-04 ENCOUNTER — OFFICE VISIT (OUTPATIENT)
Dept: FAMILY MEDICINE | Facility: CLINIC | Age: 59
End: 2024-06-04
Payer: COMMERCIAL

## 2024-06-04 VITALS
HEART RATE: 72 BPM | WEIGHT: 218 LBS | DIASTOLIC BLOOD PRESSURE: 80 MMHG | HEIGHT: 73 IN | SYSTOLIC BLOOD PRESSURE: 120 MMHG | BODY MASS INDEX: 28.89 KG/M2 | RESPIRATION RATE: 14 BRPM | OXYGEN SATURATION: 97 % | TEMPERATURE: 99.2 F

## 2024-06-04 DIAGNOSIS — M54.50 MIDLINE LOW BACK PAIN, UNSPECIFIED CHRONICITY, UNSPECIFIED WHETHER SCIATICA PRESENT: Primary | ICD-10-CM

## 2024-06-04 DIAGNOSIS — M54.50 MIDLINE LOW BACK PAIN, UNSPECIFIED CHRONICITY, UNSPECIFIED WHETHER SCIATICA PRESENT: ICD-10-CM

## 2024-06-04 PROCEDURE — 99213 OFFICE O/P EST LOW 20 MIN: CPT | Performed by: FAMILY MEDICINE

## 2024-06-04 PROCEDURE — 72100 X-RAY EXAM L-S SPINE 2/3 VWS: CPT | Mod: TC | Performed by: RADIOLOGY

## 2024-06-04 RX ORDER — METHYLPREDNISOLONE 4 MG
TABLET, DOSE PACK ORAL
Qty: 21 TABLET | Refills: 0 | Status: SHIPPED | OUTPATIENT
Start: 2024-06-04

## 2024-06-04 ASSESSMENT — PAIN SCALES - GENERAL: PAINLEVEL: MODERATE PAIN (5)

## 2024-06-04 ASSESSMENT — ENCOUNTER SYMPTOMS: BACK PAIN: 1

## 2024-06-04 NOTE — NURSING NOTE
"Chief Complaint   Patient presents with    Back Pain       Initial /80   Pulse 72   Temp 99.2  F (37.3  C) (Tympanic)   Resp 14   Ht 1.854 m (6' 1\")   Wt 98.9 kg (218 lb)   SpO2 97%   BMI 28.76 kg/m   Estimated body mass index is 28.76 kg/m  as calculated from the following:    Height as of this encounter: 1.854 m (6' 1\").    Weight as of this encounter: 98.9 kg (218 lb).    Patient presents to the clinic using No DME    Is there anyone who you would like to be able to receive your results? No  If yes have patient fill out JASMINA      "

## 2024-06-04 NOTE — PROGRESS NOTES
"  Assessment & Plan     Midline low back pain, unspecified chronicity, unspecified whether sciatica present  Differentials discussed in detail including degenerative disc disease.  X-ray lumbar spine ordered.  Medrol pack prescribed.  Recommended to continue over-the-counter oral/topical analgesia, icing and activity as tolerated.  Physical therapy referral placed.  Follow-up if symptoms persist or worsen.  All questions answered.  - XR Lumbar Spine 2/3 Views; Future  - Physical Therapy  Referral; Future  - methylPREDNISolone (MEDROL DOSEPAK) 4 MG tablet therapy pack; Follow Package Directions      Geraldo Whitley is a 58 year old, presenting for the following health issues:  Back Pain        6/4/2024     5:01 PM   Additional Questions   Roomed by Khloe BRUCE   Accompanied by self     Back Pain     History of Present Illness       Back Pain:  He presents for follow up of back pain. Patient's back pain is a recurring problem.  Location of back pain:  Left middle of back  Description of back pain: shooting  Back pain spreads: nowhere    Since patient first noticed back pain, pain is: gradually improving  Does back pain interfere with his job:  No       He eats 2-3 servings of fruits and vegetables daily.He consumes 2 sweetened beverage(s) daily.He exercises with enough effort to increase his heart rate 9 or less minutes per day.  He exercises with enough effort to increase his heart rate 3 or less days per week.   He is taking medications regularly.       Review of Systems  Constitutional, HEENT, cardiovascular, pulmonary, gi and gu systems are negative, except as otherwise noted.      Objective    /80   Pulse 72   Temp 99.2  F (37.3  C) (Tympanic)   Resp 14   Ht 1.854 m (6' 1\")   Wt 98.9 kg (218 lb)   SpO2 97%   BMI 28.76 kg/m    Body mass index is 28.76 kg/m .  Physical Exam   GENERAL: alert and no distress  EYES: Eyes grossly normal to inspection  NECK: no adenopathy, no asymmetry, masses, or " scars  RESP: no wheezes  ABDOMEN: soft, nontender  MS: No spinal/paraspinal tenderness, skin discoloration noted, normal lower extremities strength, normal gait, sensation to touch and pressure intact, peripheral pulses 3+  SKIN: no suspicious lesions or rashes  NEURO: Normal strength and tone, mentation intact and speech normal  PSYCH: mentation appears normal, affect normal/bright      Signed Electronically by: All Zuniga MD

## 2024-06-25 ENCOUNTER — THERAPY VISIT (OUTPATIENT)
Dept: PHYSICAL THERAPY | Facility: CLINIC | Age: 59
End: 2024-06-25
Attending: FAMILY MEDICINE
Payer: COMMERCIAL

## 2024-06-25 DIAGNOSIS — M54.50 MIDLINE LOW BACK PAIN, UNSPECIFIED CHRONICITY, UNSPECIFIED WHETHER SCIATICA PRESENT: ICD-10-CM

## 2024-06-25 PROCEDURE — 97110 THERAPEUTIC EXERCISES: CPT | Mod: GP | Performed by: PHYSICAL THERAPIST

## 2024-06-25 PROCEDURE — 97161 PT EVAL LOW COMPLEX 20 MIN: CPT | Mod: GP | Performed by: PHYSICAL THERAPIST

## 2024-06-25 NOTE — PROGRESS NOTES
PHYSICAL THERAPY EVALUATION  Type of Visit: Evaluation     Fall Risk Screen:  Fall screen completed by: PT  Have you fallen 2 or more times in the past year?: No  Have you fallen and had an injury in the past year?: No  Is patient a fall risk?: No    Subjective       Presenting condition or subjective complaint: Lower back pain comes on suddenly after doing something usually goes away in 7-10 days last episode lasted 6 weeks.   Reports has a hx of low back pain over the past 20 years.  Would happen 2-3 x a year and would last about a week.  This time that isn't the case.  Reports usually lifting and twisting wrong increases pain.  Date of onset: 24    Relevant medical history:     Past Medical History:   Diagnosis Date    HAY FEVER      Dates & types of surgery: deviated septum?    Prior diagnostic imaging/testing results: X-ray     Prior therapy history for the same diagnosis, illness or injury: No      Living Environment  Social support: Alone   Type of home: House   Stairs to enter the home: Yes 8 Is there a railing: Yes     Ramp: No   Stairs inside the home: No       Help at home: None  Equipment owned:       Employment: Yes Teacher - Middle school science  Hobbies/Interests: Usounding    Patient goals for therapy: want to prevent long lasting pain    Pain assessment: Location: low back - slightly to the left/Ratin/10     Objective   LUMBAR SPINE EVALUATION  PAIN: Pain Level at Rest: 1/10  Pain Level with Use: 8/10  Pain Location: lumbar spine and 1-2 inches to the left  Pain Quality: Severe Ache  Pain Frequency: intermittent  Pain is Worst: morning - side sleeper  Pain is Exacerbated By: Supine to sit in mornings  Pain is Relieved By: cold and rest  Pain Progression: Improved  INTEGUMENTARY (edema, incisions): WNL  POSTURE: WNL  GAIT:   Weightbearing Status: WBAT  Assistive Device(s): None  Gait Deviations: WNL  BALANCE/PROPRIOCEPTION: WNL    ROM:   (Degrees) Left AROM Left PROM   Right AROM Right PROM   Hip Flexion       Hip Extension neutral  10*    Hip Abduction       Hip Adduction       Hip Internal Rotation 20*  25*    Hip External Rotation 35*  40*    Knee Flexion       Knee Extension       Lumbar Side glide Finger tips 2 inches from knee joint line Finger tips 2 inches from knee joint line   Lumbar Flexion 95*   Lumbar Extension 30*   Pain:   End feel:   PELVIC/SI SCREEN: WNL    MYOTOMES:    Left Right   T12-L3 (Hip Flexion) 5- 5   L2-4 (Quads)  5 5   L4 (Ankle DF) 5 5   L5 (Great Toe Ext) 4+ 4+   S1 (Toe Raise) 5 5   Hip Abduction 4- 4-   Hip Extension 4 4     DERMATOMES:    Left Right   T12      L1     L2     L3 Normal (light touch) Normal (light touch)   L4 Normal (light touch) Normal (light touch)   L5 Normal (light touch) Normal (light touch)   S1 Normal (light touch) Normal (light touch)     NEURAL TENSION: Lumbar WNL  FLEXIBILITY:  B HS restricted; L piriformis  LUMBAR/HIP Special Tests:    Left Right   NADINE Negative  Negative    FADIR/Labrum/MIESHA Negative  Negative    Femoral Nerve     Macrina's     Piriformis Positive Negative    Quadrant Testing     SLR Negative  Negative    Slump Negative  Negative    Stork with Extension     Shilo Positive Negative            PELVIS/SI SPECIAL TESTS: WNL  FUNCTIONAL TESTS: Double Leg Squat: Anterior knee translation and Improper use of glutes/hips  PALPATION:  TTP L PSIS and sacral sulcus; TTP L5; Hypertonicity L lumbar parapsinals  SPINAL SEGMENTAL CONCLUSIONS:  Hypomobility L3-L5    Assessment & Plan   CLINICAL IMPRESSIONS  Medical Diagnosis: Midline low back pain, unspecified chronicity, unspecified whether sciatica present    Treatment Diagnosis: Low back pain with core weakness   Impression/Assessment: Patient is a 58 year old male with low back pain complaints.  The following significant findings have been identified: Pain, Decreased ROM/flexibility, Decreased joint mobility, and Decreased strength. These impairments interfere with  their ability to perform self care tasks, work tasks, recreational activities, and household chores as compared to previous level of function.     Clinical Decision Making (Complexity):  Clinical Presentation: Stable/Uncomplicated  Clinical Presentation Rationale: based on medical and personal factors listed in PT evaluation  Clinical Decision Making (Complexity): Low complexity    PLAN OF CARE  Treatment Interventions:  Modalities: Cryotherapy, E-stim, Hot Pack, Mechanical Traction, Ultrasound  Interventions: Manual Therapy, Neuromuscular Re-education, Therapeutic Activity, Therapeutic Exercise, Self-Care/Home Management    Long Term Goals     PT Goal 1  Goal Identifier: Lumbar flexion  Goal Description: Pt will demonstrate 100* lumbar flexion in order to have reduced symptoms with lifting items off the floor.  Target Date: 08/06/24  PT Goal 2  Goal Identifier: hip abduction strength  Goal Description: Pt will demonstrate 4/5 hip abduction strength in order to have improved symptoms with standing for long periods of time.  Target Date: 08/20/24  PT Goal 3  Goal Identifier: HEP  Goal Description: Pt will be independent with HEP in order to maintain improvements upon discharge.  Target Date: 08/20/24      Frequency of Treatment: 1 x a week  Duration of Treatment: 8 weeks    Recommended Referrals to Other Professionals:  none  Education Assessment:   Learner/Method: Patient  Education Comments: anatomy of lumbar spine    Risks and benefits of evaluation/treatment have been explained.   Patient/Family/caregiver agrees with Plan of Care.     Evaluation Time:     PT Eval, Low Complexity Minutes (81047): 25       Signing Clinician: Stacey Price, PT

## 2024-07-02 ENCOUNTER — THERAPY VISIT (OUTPATIENT)
Dept: PHYSICAL THERAPY | Facility: CLINIC | Age: 59
End: 2024-07-02
Attending: FAMILY MEDICINE
Payer: COMMERCIAL

## 2024-07-02 DIAGNOSIS — M54.50 MIDLINE LOW BACK PAIN, UNSPECIFIED CHRONICITY, UNSPECIFIED WHETHER SCIATICA PRESENT: Primary | ICD-10-CM

## 2024-07-02 PROCEDURE — 97110 THERAPEUTIC EXERCISES: CPT | Mod: GP | Performed by: PHYSICAL THERAPIST

## 2024-07-10 ENCOUNTER — THERAPY VISIT (OUTPATIENT)
Dept: PHYSICAL THERAPY | Facility: CLINIC | Age: 59
End: 2024-07-10
Attending: FAMILY MEDICINE
Payer: COMMERCIAL

## 2024-07-10 DIAGNOSIS — M54.50 MIDLINE LOW BACK PAIN, UNSPECIFIED CHRONICITY, UNSPECIFIED WHETHER SCIATICA PRESENT: Primary | ICD-10-CM

## 2024-07-10 PROCEDURE — 97110 THERAPEUTIC EXERCISES: CPT | Mod: GP | Performed by: PHYSICAL THERAPIST

## 2024-07-26 ENCOUNTER — THERAPY VISIT (OUTPATIENT)
Dept: PHYSICAL THERAPY | Facility: CLINIC | Age: 59
End: 2024-07-26
Attending: FAMILY MEDICINE
Payer: COMMERCIAL

## 2024-07-26 DIAGNOSIS — M54.50 MIDLINE LOW BACK PAIN, UNSPECIFIED CHRONICITY, UNSPECIFIED WHETHER SCIATICA PRESENT: Primary | ICD-10-CM

## 2024-07-26 PROCEDURE — 97110 THERAPEUTIC EXERCISES: CPT | Mod: GP | Performed by: PHYSICAL THERAPIST

## 2024-12-26 ENCOUNTER — PATIENT OUTREACH (OUTPATIENT)
Dept: CARE COORDINATION | Facility: CLINIC | Age: 59
End: 2024-12-26
Payer: COMMERCIAL

## 2025-01-09 ENCOUNTER — PATIENT OUTREACH (OUTPATIENT)
Dept: CARE COORDINATION | Facility: CLINIC | Age: 60
End: 2025-01-09
Payer: COMMERCIAL

## 2025-01-22 ENCOUNTER — OFFICE VISIT (OUTPATIENT)
Dept: FAMILY MEDICINE | Facility: CLINIC | Age: 60
End: 2025-01-22
Payer: COMMERCIAL

## 2025-01-22 VITALS
RESPIRATION RATE: 20 BRPM | SYSTOLIC BLOOD PRESSURE: 110 MMHG | HEART RATE: 83 BPM | TEMPERATURE: 98 F | OXYGEN SATURATION: 95 % | WEIGHT: 224.2 LBS | HEIGHT: 73 IN | BODY MASS INDEX: 29.72 KG/M2 | DIASTOLIC BLOOD PRESSURE: 62 MMHG

## 2025-01-22 DIAGNOSIS — Z12.5 SCREENING FOR PROSTATE CANCER: ICD-10-CM

## 2025-01-22 DIAGNOSIS — Z11.4 SCREENING FOR HIV (HUMAN IMMUNODEFICIENCY VIRUS): ICD-10-CM

## 2025-01-22 DIAGNOSIS — E78.5 HYPERLIPIDEMIA LDL GOAL <130: ICD-10-CM

## 2025-01-22 DIAGNOSIS — Z00.00 ROUTINE HISTORY AND PHYSICAL EXAMINATION OF ADULT: Primary | ICD-10-CM

## 2025-01-22 PROCEDURE — 99213 OFFICE O/P EST LOW 20 MIN: CPT | Mod: 25 | Performed by: FAMILY MEDICINE

## 2025-01-22 PROCEDURE — 99396 PREV VISIT EST AGE 40-64: CPT | Performed by: FAMILY MEDICINE

## 2025-01-22 RX ORDER — ATORVASTATIN CALCIUM 20 MG/1
20 TABLET, FILM COATED ORAL DAILY
Qty: 90 TABLET | Refills: 3 | Status: SHIPPED | OUTPATIENT
Start: 2025-01-22

## 2025-01-22 SDOH — HEALTH STABILITY: PHYSICAL HEALTH: ON AVERAGE, HOW MANY DAYS PER WEEK DO YOU ENGAGE IN MODERATE TO STRENUOUS EXERCISE (LIKE A BRISK WALK)?: 1 DAY

## 2025-01-22 ASSESSMENT — PAIN SCALES - GENERAL: PAINLEVEL_OUTOF10: NO PAIN (0)

## 2025-01-22 ASSESSMENT — SOCIAL DETERMINANTS OF HEALTH (SDOH): HOW OFTEN DO YOU GET TOGETHER WITH FRIENDS OR RELATIVES?: THREE TIMES A WEEK

## 2025-01-22 NOTE — PROGRESS NOTES
"Preventive Care Visit  [facility]  All Zuniga MD, Family Medicine  Jan 22, 2025      Assessment & Plan     Routine history and physical examination of adult  Medically doing well.  Recommend regular exercise, healthy diet and weight loss.  Patient deferred routine vaccines  - Glucose; Future    Hyperlipidemia LDL goal <130  Lipitor refilled  - Lipid panel reflex to direct LDL Non-fasting; Future  - atorvastatin (LIPITOR) 20 MG tablet; Take 1 tablet (20 mg) by mouth daily.    Screening for HIV (human immunodeficiency virus)  Patient deferred HIV screening    Screening for prostate cancer  - PSA, screen; Future      BMI  Estimated body mass index is 29.58 kg/m  as calculated from the following:    Height as of this encounter: 1.854 m (6' 1\").    Weight as of this encounter: 101.7 kg (224 lb 3.2 oz).   Weight management plan: Discussed healthy diet and exercise guidelines    Counseling  Appropriate preventive services were addressed with this patient via screening, questionnaire, or discussion as appropriate for fall prevention, nutrition, physical activity, Tobacco-use cessation, social engagement, weight loss and cognition.  Checklist reviewing preventive services available has been given to the patient.  Reviewed patient's diet, addressing concerns and/or questions.   He is at risk for lack of exercise and has been provided with information to increase physical activity for the benefit of his well-being.         Geraldo Whitley is a 59 year old, presenting for the following:  Physical        1/22/2025     3:16 PM   Additional Questions   Roomed by Candy CHAIREZ CMA          HPI  Hyperlipidemia Follow-Up    Are you regularly taking any medication or supplement to lower your cholesterol?   Yes- Lipitor 20 mg  Are you having muscle aches or other side effects that you think could be caused by your cholesterol lowering medication?  No        1/22/2025   General Health   How would you rate " your overall physical health? Good   Feel stress (tense, anxious, or unable to sleep) Not at all         1/22/2025   Nutrition   Three or more servings of calcium each day? (!) I DON'T KNOW   Diet: Regular (no restrictions)   How many servings of fruit and vegetables per day? (!) 0-1   How many sweetened beverages each day? (!) 2         1/22/2025   Exercise   Days per week of moderate/strenous exercise 1 day   (!) EXERCISE CONCERN      1/22/2025   Social Factors   Frequency of gathering with friends or relatives Three times a week   Worry food won't last until get money to buy more No   Food not last or not have enough money for food? No   Do you have housing? (Housing is defined as stable permanent housing and does not include staying ouside in a car, in a tent, in an abandoned building, in an overnight shelter, or couch-surfing.) Yes   Are you worried about losing your housing? No   Lack of transportation? No   Unable to get utilities (heat,electricity)? No         1/22/2025   Fall Risk   Fallen 2 or more times in the past year? No   Trouble with walking or balance? No          1/22/2025   Dental   Dentist two times every year? Yes         1/22/2025   TB Screening   Were you born outside of the [place]? No         Today's PHQ-2 Score:       1/22/2025     3:06 PM   PHQ-2 ( 1999 [employer])   Q1: Little interest or pleasure in doing things 0   Q2: Feeling down, depressed or hopeless 0   PHQ-2 Score 0    Q1: Little interest or pleasure in doing things Not at all   Q2: Feeling down, depressed or hopeless Not at all   PHQ-2 Score 0       Patient-reported           1/22/2025   Substance Use   Alcohol more than 3/day or more than 7/wk No   Do you use any other substances recreationally? No     Social History     Tobacco Use    Smoking status: Never     Passive exposure: Never    Smokeless tobacco: Never   Vaping Use    Vaping status: Never Used   Substance Use Topics    Alcohol use: Yes     Comment: social    Drug use: No              1/22/2025   One time HIV Screening   Previous HIV test? Yes         1/22/2025   STI Screening   New sexual partner(s) since last STI/HIV test? No   Last PSA:   PSA   Date Value Ref Range Status   12/02/2019 0.44 0 - 4 ug/L Final     Comment:     Assay Method:  Chemiluminescence using Siemens Vista analyzer     Prostate Specific Antigen Screen   Date Value Ref Range Status   01/23/2024 0.62 0.00 - 3.50 ng/mL Final     ASCVD Risk   The 10-year ASCVD risk score (Ada HAMMER, et al., 2019) is: 8.1%    Values used to calculate the score:      Age: 59 years      Sex: Male      Is Non- : No      Diabetic: No      Tobacco smoker: No      Systolic Blood Pressure: 110 mmHg      Is BP treated: No      HDL Cholesterol: 30 mg/dL      Total Cholesterol: 180 mg/dL       Reviewed and updated as needed this visit by Provider                    Past Medical History:   Diagnosis Date    HAY FEVER      Past Surgical History:   Procedure Laterality Date    COLONOSCOPY N/A 12/29/2016    Procedure: COLONOSCOPY;  Surgeon: Raghu Mishra MD;  Location: [facility]    ENT SURGERY      ESOPHAGOSCOPY, GASTROSCOPY, DUODENOSCOPY (EGD), COMBINED N/A 5/7/2021    Procedure: ESOPHAGOGASTRODUODENOSCOPY, WITH BIOPSY;  Surgeon: Jhonatan Santacruz MD;  Location: [facility]    SURGICAL HISTORY OF -       Septoplasty    wisdom teeth       OB History   No obstetric history on file.     Lab work is in process  Labs reviewed in EPIC  BP Readings from Last 3 Encounters:   01/22/25 110/62   06/04/24 120/80   01/23/24 130/84    Wt Readings from Last 3 Encounters:   01/22/25 101.7 kg (224 lb 3.2 oz)   06/04/24 98.9 kg (218 lb)   01/23/24 98 kg (216 lb)                  Patient Active Problem List   Diagnosis    Allergic rhinitis    GERD    Hyperlipidemia LDL goal <130    Elevated uric acid in blood    Midline low back pain, unspecified chronicity, unspecified whether sciatica present     Past Surgical History:   Procedure Laterality  "Date    COLONOSCOPY N/A 12/29/2016    Procedure: COLONOSCOPY;  Surgeon: Raghu Mishra MD;  Location: [facility]    ENT SURGERY      ESOPHAGOSCOPY, GASTROSCOPY, DUODENOSCOPY (EGD), COMBINED N/A 5/7/2021    Procedure: ESOPHAGOGASTRODUODENOSCOPY, WITH BIOPSY;  Surgeon: Jhonatan Santacruz MD;  Location: [facility]    SURGICAL HISTORY OF -       Septoplasty    wisdom teeth         Social History     Tobacco Use    Smoking status: Never     Passive exposure: Never    Smokeless tobacco: Never   Substance Use Topics    Alcohol use: Yes     Comment: social     Family History   Problem Relation Age of Onset    Cancer Father         Lung-smoker    Other Cancer Father     Neurologic Disorder Paternal Grandfather         parkinsons    C.A.D. Maternal Grandfather         CAB in his 50s    Respiratory Maternal Grandfather         Emphysemia    Prostate Cancer No family hx of     Colon Cancer No family hx of          Current Outpatient Medications   Medication Sig Dispense Refill    atorvastatin (LIPITOR) 20 MG tablet Take 1 tablet (20 mg) by mouth daily. 90 tablet 3    fluticasone (FLONASE) 50 MCG/ACT nasal spray Spray 2 sprays into both nostrils daily 16 g 11     No Known Allergies  Recent Labs   Lab Test 01/23/24  1551 11/04/22  0855 12/02/19  1617   A1C 5.5  --   --    LDL 71 193* 74   HDL 30* 33* 38*   TRIG 396* 272* 305*   CR 1.26* 1.22*  --    GFRESTIMATED 66 70  --    POTASSIUM 4.0 4.4  --           Review of Systems  Constitutional, neuro, ENT, endocrine, pulmonary, cardiac, gastrointestinal, genitourinary, musculoskeletal, integument and psychiatric systems are negative, except as otherwise noted.     Objective    Exam  /62 (BP Location: Right arm, Patient Position: Sitting, Cuff Size: Adult Large)   Pulse 83   Temp 98  F (36.7  C) (Tympanic)   Resp 20   Ht 1.854 m (6' 1\")   Wt 101.7 kg (224 lb 3.2 oz)   SpO2 95%   BMI 29.58 kg/m     Estimated body mass index is 29.58 kg/m  as calculated from the following:    Height as " "of this encounter: 1.854 m (6' 1\").    Weight as of this encounter: 101.7 kg (224 lb 3.2 oz).  Wt Readings from Last 10 Encounters:   01/22/25 101.7 kg (224 lb 3.2 oz)   06/04/24 98.9 kg (218 lb)   01/23/24 98 kg (216 lb)   11/04/22 98.6 kg (217 lb 6.4 oz)   04/30/21 99.3 kg (219 lb)   09/28/20 99.8 kg (220 lb)   12/23/19 94.3 kg (208 lb)   12/02/19 93 kg (205 lb)   09/22/17 90.7 kg (200 lb)   12/29/16 86.2 kg (190 lb)      Physical Exam  GENERAL: alert and no distress  EYES: Eyes grossly normal to inspection, PERRL and conjunctivae and sclerae normal  HENT: normal cephalic/atraumatic, nose and mouth without ulcers or lesions, oropharynx clear, and oral mucous membranes moist  NECK: no adenopathy, no asymmetry, masses, or scars  RESP: lungs clear to auscultation - no rales, rhonchi or wheezes  CV: regular rates and rhythm, normal S1 S2, no S3 or S4, and no murmur, click or rub  ABDOMEN: soft, nontender  MS: no gross musculoskeletal defects noted, no edema  SKIN: no suspicious lesions or rashes  NEURO: Normal strength and tone, mentation intact and speech normal  PSYCH: mentation appears normal, affect normal/bright      Signed Electronically by: All Zuniga MD    "

## 2025-03-12 ENCOUNTER — OFFICE VISIT (OUTPATIENT)
Dept: FAMILY MEDICINE | Facility: CLINIC | Age: 60
End: 2025-03-12
Payer: COMMERCIAL

## 2025-03-12 VITALS
DIASTOLIC BLOOD PRESSURE: 84 MMHG | BODY MASS INDEX: 30.11 KG/M2 | SYSTOLIC BLOOD PRESSURE: 122 MMHG | RESPIRATION RATE: 20 BRPM | OXYGEN SATURATION: 98 % | HEIGHT: 73 IN | TEMPERATURE: 97.9 F | WEIGHT: 227.2 LBS | HEART RATE: 77 BPM

## 2025-03-12 DIAGNOSIS — J32.0 MAXILLARY SINUSITIS, UNSPECIFIED CHRONICITY: Primary | ICD-10-CM

## 2025-03-12 DIAGNOSIS — R09.81 SINUS CONGESTION: ICD-10-CM

## 2025-03-12 PROCEDURE — 99214 OFFICE O/P EST MOD 30 MIN: CPT | Performed by: FAMILY MEDICINE

## 2025-03-12 RX ORDER — FLUTICASONE PROPIONATE 50 MCG
2 SPRAY, SUSPENSION (ML) NASAL DAILY
Qty: 16 G | Refills: 11 | Status: SHIPPED | OUTPATIENT
Start: 2025-03-12

## 2025-03-12 ASSESSMENT — PAIN SCALES - GENERAL: PAINLEVEL_OUTOF10: NO PAIN (0)

## 2025-03-12 NOTE — PROGRESS NOTES
"  Assessment & Plan     (J32.0) Maxillary sinusitis, unspecified chronicity  (primary encounter diagnosis)  Comment: Differentials discussed in detail.  Symptoms likely secondary to sinus infection.  Augmentin prescribed.  Recommended well hydration, warm fluids, steam inhalation, humidifier use and over-the-counter antihistamine  Plan: amoxicillin-clavulanate (AUGMENTIN) 875-125 MG         tablet       (R09.81) Sinus congestion  Comment: Flonase refilled.  History of nasal surgery.  ENT referral placed considering ongoing congestion  Plan: Adult ENT  Referral         Subjective   Gutierrez is a 59 year old, presenting for the following health issues:  Sinus Problem        3/12/2025     3:54 PM   Additional Questions   Roomed by Candy CHAIREZ CMA     History of Present Illness       Reason for visit:  Sinus issues  Symptom onset:  More than a month  Symptoms include:  Sinus pain, congestion, ear ringing, cough from tickle in his throat  Symptom intensity:  Moderate  Symptom progression:  Staying the same  What makes it better:  Throat lozenges   He is taking medications regularly.          Review of Systems  Constitutional, neuro, ENT, endocrine, pulmonary, cardiac, gastrointestinal, genitourinary, musculoskeletal, integument and psychiatric systems are negative, except as otherwise noted.      Objective    /84 (BP Location: Right arm, Patient Position: Sitting, Cuff Size: Adult Large)   Pulse 77   Temp 97.9  F (36.6  C) (Tympanic)   Resp 20   Ht 1.854 m (6' 1\")   Wt 103.1 kg (227 lb 3.2 oz)   SpO2 98%   BMI 29.98 kg/m    Body mass index is 29.98 kg/m .  Physical Exam   GENERAL: alert and no distress  EYES: Eyes grossly normal to inspection, PERRL and conjunctivae and sclerae normal  HENT: normal cephalic/atraumatic, ear canals and TM's normal, nasal mucosa edematous , rhinorrhea yellow, oral mucous membranes moist, and sinuses: maxillary tenderness on both sides  NECK: no adenopathy, no asymmetry, " masses, or scars  RESP: lungs clear to auscultation - no rales, rhonchi or wheezes  CV: regular rates and rhythm, normal S1 S2, no S3 or S4, and no murmur, click or rub  MS: no gross musculoskeletal defects noted, no edema  NEURO: Normal strength and tone, mentation intact and speech normal  PSYCH: mentation appears normal, affect normal/bright        Signed Electronically by: All Zuniga MD

## 2025-04-15 NOTE — PROGRESS NOTES
Chief Complaint   Patient presents with    Consult    Nasal Congestion     History of Present Illness   Gutierrez Orta is a 59 year old male who presents for evaluation. Referred by Dr.Umair Zuniga for concerns of sinus congestion.     Gutierrez, tells me that a month ago he developed a sinus infection and he has been unable to breath through his nose. He tells me his previous polyp/septoplasty surgery only lasted for 6 months. He describes inability to have air flow through his ost. The past couple of days he develop sinus pressure/pain and difficulty breathing though the nose.     The patient describes symptoms of nasal congestion for the past 25 years. The patient notes history of environmental allergies. They have  been tested for allergies in the past. Treatments have included nasal steroids (Flonase), and oral antihistamines (Zyrtec, Claritin). The treatments seem to not work. No history of nasal trauma. The patient reports/nasal obstruction left greater than right, nasal congestion, no rhinorrhea, post nasal drainage, no taste/smell disturbance, face pain/pressure/fullness. No  history of epistaxis. No history of smoking. No history of migraine headache. No  history of asthma. No history of aspirin/NSAID sensitivity. He has a history of GERD and symptoms occur at night. He takes tums.     HISTORY OF ENT SURGERIES:   Unknown dates - Polyp removal and septoplasty (25 years ago)    No previous nose or sinus imaging.     Past Medical History  Patient Active Problem List   Diagnosis    Allergic rhinitis    GERD    Hyperlipidemia LDL goal <130    Elevated uric acid in blood    Midline low back pain, unspecified chronicity, unspecified whether sciatica present     Current Medications     Current Outpatient Medications:     atorvastatin (LIPITOR) 20 MG tablet, Take 1 tablet (20 mg) by mouth daily., Disp: 90 tablet, Rfl: 3    fluticasone (FLONASE) 50 MCG/ACT nasal spray, Spray 2 sprays into both nostrils daily., Disp: 16  g, Rfl: 11    Allergies  No Known Allergies    Social History   Social History     Socioeconomic History    Marital status:    Tobacco Use    Smoking status: Never     Passive exposure: Never    Smokeless tobacco: Never   Vaping Use    Vaping status: Never Used   Substance and Sexual Activity    Alcohol use: Yes     Comment: social    Drug use: No    Sexual activity: Not Currently     Partners: Female   Other Topics Concern    Parent/sibling w/ CABG, MI or angioplasty before 65F 55M? No     Social Drivers of Health     Financial Resource Strain: Low Risk  (1/22/2025)    Financial Resource Strain     Within the past 12 months, have you or your family members you live with been unable to get utilities (heat, electricity) when it was really needed?: No   Food Insecurity: Low Risk  (1/22/2025)    Food Insecurity     Within the past 12 months, did you worry that your food would run out before you got money to buy more?: No     Within the past 12 months, did the food you bought just not last and you didn t have money to get more?: No   Transportation Needs: Low Risk  (1/22/2025)    Transportation Needs     Within the past 12 months, has lack of transportation kept you from medical appointments, getting your medicines, non-medical meetings or appointments, work, or from getting things that you need?: No   Physical Activity: Unknown (1/22/2025)    Exercise Vital Sign     Days of Exercise per Week: 1 day   Stress: No Stress Concern Present (1/22/2025)    [employer] - Occupational Stress Questionnaire     Feeling of Stress : Not at all   Social Connections: Unknown (1/22/2025)    Social Connection and Isolation Panel [NHANES]     Frequency of Social Gatherings with Friends and Family: Three times a week   Interpersonal Safety: Low Risk  (1/22/2025)    Interpersonal Safety     Do you feel physically and emotionally safe where you currently live?: Yes     Within the past 12 months, have you  "been hit, slapped, kicked or otherwise physically hurt by someone?: No     Within the past 12 months, have you been humiliated or emotionally abused in other ways by your partner or ex-partner?: No   Housing Stability: Low Risk  (1/22/2025)    Housing Stability     Do you have housing? : Yes     Are you worried about losing your housing?: No       Family History  Family History   Problem Relation Age of Onset    Cancer Father         Lung-smoker    Other Cancer Father     Neurologic Disorder Paternal Grandfather         parkinsons    C.A.D. Maternal Grandfather         CAB in his 50s    Respiratory Maternal Grandfather         Emphysemia    Prostate Cancer No family hx of     Colon Cancer No family hx of        Review of Systems  As per HPI and PMHx, otherwise 10+ comprehensive system review is negative.    Physical Exam  /83   Pulse 77   Temp 98.2  F (36.8  C) (Tympanic)   Ht 1.854 m (6' 1\")   Wt 103 kg (227 lb)   SpO2 98%   BMI 29.95 kg/m    GENERAL: The patient is a pleasant, cooperative 59 year old male in no acute distress.  HEAD: Normocephalic, atraumatic. Hair and scalp are normal.  EYES: Pupils are equal, round, reactive to light and accommodation. Extraocular movements are intact. The sclera nonicteric without injection. The extraocular structures are normal.  EARS: Normal shape and symmetry. No tenderness when palpating the mastoid or tragal areas bilaterally. No mastoid erythema or fluctuance. Otoscopic exam on the right reveals no amount of cerumen. The right tympanic membrane is round, intact without evidence of effusion, good landmarks.  No retraction, granulation, or drainage. Otoscopic exam on the left reveals no amount of cerumen. The left tympanic membrane is round, intact without evidence of effusion, good landmarks.  No retraction, granulation, or drainage.  NOSE: Nares are patent.  Nasal mucosa is pink and dry. Nasal polyp right nostril. Hypertrophy turbinates.  ORAL CAVITY: Lips are " normal. Dentition is in good repair. Mucous membranes are moist. Tongue is mobile, protrudes to the midline.  Palate elevates symmetrically. Tonsils are +0. No erythema or exudate. No oral cavity or oropharyngeal masses, lesions, ulcerations, or leukoplakia.  NECK: Supple, trachea is midline. There is no palpable cervical lymphadenopathy or masses bilaterally. Palpation of the bilateral parotid and submandibular areas reveal no masses. No thyromegaly.    NEUROLOGIC: Cranial nerves II through XII are grossly intact. Voice is strong. Patient is House-Brackmann I/VI bilaterally.  CARDIOVASCULAR: Extremities are warm and well-perfused. No significant peripheral edema.  RESPIRATORY: Patient has nonlabored breathing without cough, wheeze, stridor.  PSYCHIATRIC: Patient is alert and oriented. Mood and affect appear normal.  SKIN: Warm and dry. No scalp, face, or neck lesions noted.      Procedure: Flexible Nasal Endoscopy  Indication: Chronic nose and sinus symptoms    To best visualize the sinonasal anatomy and due to the chief complaint and HPI, I proceeded with flexible fiberoptic nasal endoscopy.  The bilateral nasal cavities were anesthetized and decongested. The bilateralnasal cavities were then examined using flexible fiberoptic nasal endoscope. The right nasal cavity had a nasal polyp. The left nasal cavity was without masses, polyps, or mucopurulence. The left middle turbinate and middle meatus was normal in appearance. The sphenoethmoid recess, inferior meatus, superior meatus, and frontal sinus outflow areas were clear. The sinonasalmucosa appeared normal. The nasal septum straight. The inferior turbinates were hypertrophied. The nasopharynx had a normal appearance with normal Eustachian tube openings and fossa of Rosenmuller bilaterally. Normal adenoid tissue. The scope was removed. The patient tolerated the procedure well.              Assessment and Plan     ICD-10-CM    1. Nasal congestion  R09.81         It  was my pleasure seeing Gutierrez Orta today in clinic. The patient has chronic sinusitis with nasal polyposis.  We discussed the pathophysiology of chronic sinusitis.  We discussed medical and surgical management.  I feel the patient would benefit from nasal saline irrigations with Budesonide.  We discussed proper nasal saline irrigation technique with Budesonide.  We discussed a prednisone 20 mg 5 day burst to help reduce inflammation. We discussed adding in Flonase and an antihistamine. Will try for 2-3 months and if symptoms do not improve discuss surgical intervention (polyp removal /reduction of turbinates and/or dupixant).     JAQUI Cuevas [facility]  Otolaryngology  [facility]

## 2025-04-16 ENCOUNTER — OFFICE VISIT (OUTPATIENT)
Dept: OTOLARYNGOLOGY | Facility: CLINIC | Age: 60
End: 2025-04-16
Payer: COMMERCIAL

## 2025-04-16 VITALS
HEIGHT: 73 IN | HEART RATE: 77 BPM | OXYGEN SATURATION: 98 % | BODY MASS INDEX: 30.09 KG/M2 | SYSTOLIC BLOOD PRESSURE: 133 MMHG | TEMPERATURE: 98.2 F | WEIGHT: 227 LBS | DIASTOLIC BLOOD PRESSURE: 83 MMHG

## 2025-04-16 DIAGNOSIS — R09.81 NASAL CONGESTION: ICD-10-CM

## 2025-04-16 DIAGNOSIS — J33.9 NASAL POLYP: Primary | ICD-10-CM

## 2025-04-16 DIAGNOSIS — J34.3 HYPERTROPHY, NASAL, TURBINATE: ICD-10-CM

## 2025-04-16 RX ORDER — PREDNISONE 20 MG/1
20 TABLET ORAL DAILY
Qty: 5 TABLET | Refills: 0 | Status: SHIPPED | OUTPATIENT
Start: 2025-04-16

## 2025-04-16 RX ORDER — BUDESONIDE 0.5 MG/2ML
INHALANT ORAL
Qty: 840 ML | Refills: 0 | Status: SHIPPED | OUTPATIENT
Start: 2025-04-16

## 2025-04-16 ASSESSMENT — PAIN SCALES - GENERAL: PAINLEVEL_OUTOF10: NO PAIN (0)

## 2025-04-16 NOTE — LETTER
4/16/2025      Gutierrez Orta  [address] [facility] [id number]      Dear Colleague,    Thank you for referring your patient, Gutierrez Orta, to the [facility]. Please see a copy of my visit note below.    Chief Complaint   Patient presents with     Consult     Nasal Congestion     History of Present Illness   Gutierrez Orta is a 59 year old male who presents for evaluation. Referred by Dr.Umair Zuniga for concerns of sinus congestion.     The patient describes symptoms of nasal congestion for the past 25 years. The patient notes history of environmental allergies. They have  been tested for allergies in the past. Treatments have included nasal steroids (Flonase), and oral antihistamines (Zyrtec, Claritin). The treatments seem to not work. No history of nasal trauma. The patient reports/nasal obstruction left greater than right, nasal congestion, no rhinorrhea, post nasal drainage, no taste/smell disturbance, face pain/pressure/fullness. No  history of epistaxis. No history of smoking. No history of migraine headache. No  history of asthma. No history of aspirin/NSAID sensitivity. He has a history of GERD and symptoms occur at night. He takes tums.     HISTORY OF ENT SURGERIES:   Unknown dates - Polypectomy and septoplasty (25 years ago)    No previous nose or sinus imaging.     Past Medical History  Patient Active Problem List   Diagnosis     Allergic rhinitis     GERD     Hyperlipidemia LDL goal <130     Elevated uric acid in blood     Midline low back pain, unspecified chronicity, unspecified whether sciatica present     Current Medications     Current Outpatient Medications:      atorvastatin (LIPITOR) 20 MG tablet, Take 1 tablet (20 mg) by mouth daily., Disp: 90 tablet, Rfl: 3     fluticasone (FLONASE) 50 MCG/ACT nasal spray, Spray 2 sprays into both nostrils daily., Disp: 16 g, Rfl: 11    Allergies  No Known Allergies    Social History   Social History     Socioeconomic History      Marital status:    Tobacco Use     Smoking status: Never     Passive exposure: Never     Smokeless tobacco: Never   Vaping Use     Vaping status: Never Used   Substance and Sexual Activity     Alcohol use: Yes     Comment: social     Drug use: No     Sexual activity: Not Currently     Partners: Female   Other Topics Concern     Parent/sibling w/ CABG, MI or angioplasty before 65F 55M? No     Social Drivers of Health     Financial Resource Strain: Low Risk  (1/22/2025)    Financial Resource Strain      Within the past 12 months, have you or your family members you live with been unable to get utilities (heat, electricity) when it was really needed?: No   Food Insecurity: Low Risk  (1/22/2025)    Food Insecurity      Within the past 12 months, did you worry that your food would run out before you got money to buy more?: No      Within the past 12 months, did the food you bought just not last and you didn t have money to get more?: No   Transportation Needs: Low Risk  (1/22/2025)    Transportation Needs      Within the past 12 months, has lack of transportation kept you from medical appointments, getting your medicines, non-medical meetings or appointments, work, or from getting things that you need?: No   Physical Activity: Unknown (1/22/2025)    Exercise Vital Sign      Days of Exercise per Week: 1 day   Stress: No Stress Concern Present (1/22/2025)    [employer] - Occupational Stress Questionnaire      Feeling of Stress : Not at all   Social Connections: Unknown (1/22/2025)    Social Connection and Isolation Panel [NHANES]      Frequency of Social Gatherings with Friends and Family: Three times a week   Interpersonal Safety: Low Risk  (1/22/2025)    Interpersonal Safety      Do you feel physically and emotionally safe where you currently live?: Yes      Within the past 12 months, have you been hit, slapped, kicked or otherwise physically hurt by someone?: No      Within the past  "12 months, have you been humiliated or emotionally abused in other ways by your partner or ex-partner?: No   Housing Stability: Low Risk  (1/22/2025)    Housing Stability      Do you have housing? : Yes      Are you worried about losing your housing?: No       Family History  Family History   Problem Relation Age of Onset     Cancer Father         Lung-smoker     Other Cancer Father      Neurologic Disorder Paternal Grandfather         parkinsons     C.A.D. Maternal Grandfather         CAB in his 50s     Respiratory Maternal Grandfather         Emphysemia     Prostate Cancer No family hx of      Colon Cancer No family hx of        Review of Systems  As per HPI and PMHx, otherwise 10+ comprehensive system review is negative.    Physical Exam  /83   Pulse 77   Temp 98.2  F (36.8  C) (Tympanic)   Ht 1.854 m (6' 1\")   Wt 103 kg (227 lb)   SpO2 98%   BMI 29.95 kg/m    GENERAL: The patient is a pleasant, cooperative 59 year old male in no acute distress.  HEAD: Normocephalic, atraumatic. Hair and scalp are normal.  EYES: Pupils are equal, round, reactive to light and accommodation. Extraocular movements are intact. The sclera nonicteric without injection. The extraocular structures are normal.  EARS: Normal shape and symmetry. No tenderness when palpating the mastoid or tragal areas bilaterally. No mastoid erythema or fluctuance. Otoscopic exam on the right reveals no amount of cerumen. The right tympanic membrane is round, intact without evidence of effusion, good landmarks.  No retraction, granulation, or drainage. Otoscopic exam on the left reveals no amount of cerumen. The left tympanic membrane is round, intact without evidence of effusion, good landmarks.  No retraction, granulation, or drainage.  NOSE: Nares are patent.  Nasal mucosa is pink and dry. Nasal polyp right nostril. Hypertrophy turbinates.  ORAL CAVITY: Lips are normal. Dentition is in good repair. Mucous membranes are moist. Tongue is " mobile, protrudes to the midline.  Palate elevates symmetrically. Tonsils are +0. No erythema or exudate. No oral cavity or oropharyngeal masses, lesions, ulcerations, or leukoplakia.  NECK: Supple, trachea is midline. There is no palpable cervical lymphadenopathy or masses bilaterally. Palpation of the bilateral parotid and submandibular areas reveal no masses. No thyromegaly.    NEUROLOGIC: Cranial nerves II through XII are grossly intact. Voice is strong. Patient is House-Brackmann I/VI bilaterally.  CARDIOVASCULAR: Extremities are warm and well-perfused. No significant peripheral edema.  RESPIRATORY: Patient has nonlabored breathing without cough, wheeze, stridor.  PSYCHIATRIC: Patient is alert and oriented. Mood and affect appear normal.  SKIN: Warm and dry. No scalp, face, or neck lesions noted.      Procedure: Flexible Nasal Endoscopy  Indication: Chronic nose and sinus symptoms    To best visualize the sinonasal anatomy and due to the chief complaint and HPI, I proceeded with flexible fiberoptic nasal endoscopy.  The bilateral nasal cavities were anesthetized and decongested. The bilateralnasal cavities were then examined using flexible fiberoptic nasal endoscope. The right nasal cavity had a nasal polyp. The left nasal cavity was without masses, polyps, or mucopurulence. The left middle turbinate and middle meatus was normal in appearance. The sphenoethmoid recess, inferior meatus, superior meatus, and frontal sinus outflow areas were clear. The sinonasalmucosa appeared normal. The nasal septum straight. The inferior turbinates were hypertrophied. The nasopharynx had a normal appearance with normal Eustachian tube openings and fossa of Rosenmuller bilaterally. Normal adenoid tissue. The scope was removed. The patient tolerated the procedure well.              Assessment and Plan     ICD-10-CM    1. Nasal congestion  R09.81         It was my pleasure seeing Gutierrez Orta today in clinic. The patient has  chronic sinusitis with nasal polyposis.  We discussed the pathophysiology of chronic sinusitis.  We discussed medical and surgical management.  I feel the patient would benefit from nasal saline irrigations with Budesonide.  We discussed proper nasal saline irrigation technique with Budesonide.  We discussed a prednisone 20 mg 5 day burst to help reduce inflammation. We discussed adding in Flonase and an antihistamine. Will try for 2-3 months and if symptoms do not improve discuss surgical intervention (polyp removal /reduction of turbinates and/or dupixant).     JAQUI Cuevas CNP  Otolaryngology  [facility]      Again, thank you for allowing me to participate in the care of your patient.        Sincerely,        JAQUI Cuevas CNP    Electronically signed

## 2025-04-16 NOTE — PATIENT INSTRUCTIONS
You were seen by Marina Suggs CNP.  If you have questions or concerns regarding your appointment today, you can reach out to our call center at [phone].  The following has been recommended at your appointment today:    You have nasal polyps, which are abnormal growths that grow within the lining of the nasal passages. This can cause difficulty breathing in and out through the nostrils. It can also cause a difficulty with smelling things or a congested feeling.         Start taking the Prednisone for the next 5 days.   Start using the nasal saline rinse with the Budesonide twice a day. You may use one round in the shower. (Start this after the Prednisone)  Budesonide is a medication that comes in a vial. Often, it is used for patient's who have asthma or COPD in a nebulizer. You do not need a nebulizer. You just need the medication and then you will dump it into the saline rinse twice daily. Directions listed below.   Start using the Flonase daily in the AM. Use 25-30 minutes after the saline nasal rinse.   Start using Claritin/Zyrtec daily.   This can be drying to the nasal passages. You may use Aquaphor or Vaseline 2-3 times daily to help prevent nasal dryness.   We will follow up in 2-3 months to see how things are going.   If symptoms improve, then we will trial you off the medications.   If symptoms do not improve, then we will discuss seeing a surgeon or starting Dupixant.     NASAL SALINE IRRIGATION INSTRUCTIONS    You will be starting nasal saline irrigations and will need to obtain the following:      - NeilMed Sinus Rinse 8 oz Kit  - Distilled or filtered water   - Normal saline salt packets    Place filtered or distilled water into the NeilMed bottle up to the fill line (DO NOT USE TAP OR WELL WATER).  Place the pre-made salt packet in the 8 oz of saline.  Shake the bottle to suspend into solution.  Lean head forward over a sink or a basin.  Rinse each side of the nose with one-half of the bottle  (each squeeze is about one-half of the bottle). Rinse the nose daily.     If you use topical nasal sprays, apply following irrigation.    Video example: https://www.youadflyerube.com/watch?v=[email or web address]        Budesonide Irrigations    Supplies: budesonide ampule, distilled water, saline packets, irrigation bottle (Boo Med) or netti pot    You will  the budesonide prescription from your pharmacy. It will come in nebulizer ampules. You will not be inhaling the solution, but rather mixing with saline and using as a nasal irrigation.    Mix 1 budesonide ampule with 8 ounces of normal saline (1 saline packet dissolved in 8 oz distilled water). Irrigate each nostril with half the bottle twice per day.     Position your head by tilting it slightly down and to the side. The irrigation bottle side is to be on the top which will allow gravity to glide through your sinuses and exit the other nostril. Irrigate by gently squeezing the bottle until half empty, then repeat on the opposite side.

## 2025-06-17 NOTE — PROGRESS NOTES
Chief Complaint   Patient presents with    Follow Up     sinusitis     History of Present Illness   Gutierrez Orta is a 59 year old male who presents for evaluation. Referred by Dr.Umair Zuniga for concerns of sinus congestion.     VISIT 04/16/25:   Gutierrez, tells me that a month ago he developed a sinus infection and he has been unable to breath through his nose. He tells me his previous polyp/septoplasty surgery only lasted for 6 months. He describes inability to have air flow through his ost. The past couple of days he develop sinus pressure/pain and difficulty breathing though the nose.      The patient describes symptoms of nasal congestion for the past 25 years. The patient notes history of environmental allergies. They have  been tested for allergies in the past. Treatments have included nasal steroids (Flonase), and oral antihistamines (Zyrtec, Claritin). The treatments seem to not work. No history of nasal trauma. The patient reports/nasal obstruction left greater than right, nasal congestion, no rhinorrhea, post nasal drainage, no taste/smell disturbance, face pain/pressure/fullness. No  history of epistaxis. No history of smoking. No history of migraine headache. No  history of asthma. No history of aspirin/NSAID sensitivity. He has a history of GERD and symptoms occur at night. He takes tums.      HISTORY OF ENT SURGERIES:   Unknown dates - Polyp removal and septoplasty (25 years ago)     No previous nose or sinus imaging.     RECOMMENDATION: Start using Budesonide nasal saline rinse, Flonase, and treated with Prednisone 20 mg for 5 days.     VISIT 06/18/25:   Today, he is here for a follow up. The patient does not notice much change. He gets a little relief from the Flonase. He is still using the Budesonide nasal rinses. He doesn't feel the prednisone helped.     Past Medical History  Patient Active Problem List   Diagnosis    Allergic rhinitis    GERD    Hyperlipidemia LDL goal <130    Elevated uric acid  in blood    Midline low back pain, unspecified chronicity, unspecified whether sciatica present     Current Medications     Current Outpatient Medications:     atorvastatin (LIPITOR) 20 MG tablet, Take 1 tablet (20 mg) by mouth daily., Disp: 90 tablet, Rfl: 3    budesonide (PULMICORT) 0.5 MG/2ML neb solution, Use one vial 2 times daily in the [facility] Nasal Rinse., Disp: 840 mL, Rfl: 0    fluticasone (FLONASE) 50 MCG/ACT nasal spray, Spray 2 sprays into both nostrils daily., Disp: 16 g, Rfl: 11    predniSONE (DELTASONE) 20 MG tablet, Take 1 tablet (20 mg) by mouth daily., Disp: 5 tablet, Rfl: 0    Allergies  No Known Allergies    Social History   Social History     Socioeconomic History    Marital status:    Tobacco Use    Smoking status: Never     Passive exposure: Never    Smokeless tobacco: Never   Vaping Use    Vaping status: Never Used   Substance and Sexual Activity    Alcohol use: Yes     Comment: social    Drug use: No    Sexual activity: Not Currently     Partners: Female   Other Topics Concern    Parent/sibling w/ CABG, MI or angioplasty before 65F 55M? No     Social Drivers of Health     Financial Resource Strain: Low Risk  (1/22/2025)    Financial Resource Strain     Within the past 12 months, have you or your family members you live with been unable to get utilities (heat, electricity) when it was really needed?: No   Food Insecurity: Low Risk  (1/22/2025)    Food Insecurity     Within the past 12 months, did you worry that your food would run out before you got money to buy more?: No     Within the past 12 months, did the food you bought just not last and you didn t have money to get more?: No   Transportation Needs: Low Risk  (1/22/2025)    Transportation Needs     Within the past 12 months, has lack of transportation kept you from medical appointments, getting your medicines, non-medical meetings or appointments, work, or from getting things that you need?: No   Physical Activity: Unknown  "(1/22/2025)    Exercise Vital Sign     Days of Exercise per Week: 1 day   Stress: No Stress Concern Present (1/22/2025)    [employer] [place] [employer] - Occupational Stress Questionnaire     Feeling of Stress : Not at all   Social Connections: Unknown (1/22/2025)    Social Connection and Isolation Panel [NHANES]     Frequency of Social Gatherings with Friends and Family: Three times a week   Interpersonal Safety: Low Risk  (1/22/2025)    Interpersonal Safety     Do you feel physically and emotionally safe where you currently live?: Yes     Within the past 12 months, have you been hit, slapped, kicked or otherwise physically hurt by someone?: No     Within the past 12 months, have you been humiliated or emotionally abused in other ways by your partner or ex-partner?: No   Housing Stability: Low Risk  (1/22/2025)    Housing Stability     Do you have housing? : Yes     Are you worried about losing your housing?: No       Family History  Family History   Problem Relation Age of Onset    Cancer Father         Lung-smoker    Other Cancer Father     Neurologic Disorder Paternal Grandfather         parkinsons    C.A.D. Maternal Grandfather         CAB in his 50s    Respiratory Maternal Grandfather         Emphysemia    Prostate Cancer No family hx of     Colon Cancer No family hx of        Review of Systems  As per HPI and PMHx, otherwise 10+ comprehensive system review is negative.    Physical Exam  /85   Pulse 74   Temp 97.2  F (36.2  C) (Tympanic)   Ht 1.854 m (6' 1\")   Wt 103 kg (227 lb)   SpO2 99%   BMI 29.95 kg/m    GENERAL: The patient is a pleasant, cooperative 59 year old male in no acute distress.  HEAD: Normocephalic, atraumatic. Hair and scalp are normal.  EYES: Pupils are equal, round, reactive to light and accommodation. Extraocular movements are intact. The sclera nonicteric without injection. The extraocular structures are normal.  EARS: Normal shape and symmetry. No tenderness when " palpating the mastoid or tragal areas bilaterally. No mastoid erythema or fluctuance. Otoscopic exam on the right reveals a no amount of cerumen. The right tympanic membrane is round, intact without evidence of effusion, good landmarks.  No retraction, granulation, or drainage. Otoscopic exam on the left reveals a no amount of cerumen. The left tympanic membrane is round, intact without evidence of effusion, good landmarks.  No retraction, granulation, or drainage.  NOSE: Nares are patent.  Nasal mucosa is pink. Hypertrophy turbinates.  ORAL CAVITY: Lips are normal. Dentition is in good repair. Mucous membranes are moist. Tongue is mobile, protrudes to the midline.  Palate elevates symmetrically. Tonsils are +0. No erythema or exudate. No oral cavity or oropharyngeal masses, lesions, ulcerations, or leukoplakia.  NECK: Supple, trachea is midline. There is no palpable cervical lymphadenopathy or masses bilaterally. Palpation of the bilateral parotid and submandibular areas reveal no masses. No thyromegaly.    NEUROLOGIC: Cranial nerves II through XII are grossly intact. Voice is strong. Patient is House-Brackmann I/VI bilaterally.  CARDIOVASCULAR: Extremities are warm and well-perfused. No significant peripheral edema.  RESPIRATORY: Patient has nonlabored breathing without cough, wheeze, stridor.  PSYCHIATRIC: Patient is alert and oriented. Mood and affect appear normal.  SKIN: Warm and dry. No scalp, face, or neck lesions noted.      Procedure: Flexible Nasal Endoscopy  Indication: Chronic nose and sinus symptoms    To best visualize the sinonasal anatomy and due to the chief complaint and HPI, I proceeded with flexible fiberoptic nasal endoscopy.  The bilateral nasal cavities were anesthetized and decongested. The bilateralnasal cavities were then examined using flexible fiberoptic nasal endoscope. The right nasal cavity was without masses, polyps, or mucopurulence. The right middle turbinate and middle meatus was  normal in appearance. The sphenoethmoid recess, inferior meatus, superior meatus, and frontal sinus outflow areas were clear. The left nasal cavity was without masses, polyps, or mucopurulence. The left middle turbinate and middle meatus was normal in appearance. The sphenoethmoid recess, inferior meatus, superior meatus, and frontal sinus outflow areas were clear. The sinonasalmucosa appeared normal. The nasal septum possibly deviated. The inferior turbinates were  hypertrophied. The nasopharynx had a normal appearance with normal Eustachian tube openings and fossa of Rosenmuller bilaterally. +2 adenoid tissue. The scope was removed. The patient tolerated the procedure well.    Assessment and Plan     ICD-10-CM    1. Nasal polyp  J33.9       2. Hypertrophy, nasal, turbinate  J34.3       3. Nasal congestion  R09.81         It was my pleasure seeing Gutierrez Orta today in clinic. Overall, the patient's nasal polyps have improved. We discussed reducing the budesonide rinse to once daily. He does have evidence of hypertrophy turbinates with a possible deviated septum. We discussed getting a CT scan to rule out any other abnormalities and following up with  for nasal obstruction in the future.     Continue using Flonase and antihistamine.     JAQUI Cuevas [facility]  Otolaryngology  [facility]

## 2025-06-18 ENCOUNTER — OFFICE VISIT (OUTPATIENT)
Dept: OTOLARYNGOLOGY | Facility: CLINIC | Age: 60
End: 2025-06-18
Payer: COMMERCIAL

## 2025-06-18 VITALS
WEIGHT: 227 LBS | OXYGEN SATURATION: 99 % | SYSTOLIC BLOOD PRESSURE: 125 MMHG | HEIGHT: 73 IN | BODY MASS INDEX: 30.09 KG/M2 | TEMPERATURE: 97.2 F | HEART RATE: 74 BPM | DIASTOLIC BLOOD PRESSURE: 85 MMHG

## 2025-06-18 DIAGNOSIS — J34.3 HYPERTROPHY, NASAL, TURBINATE: ICD-10-CM

## 2025-06-18 DIAGNOSIS — J33.9 NASAL POLYP: Primary | ICD-10-CM

## 2025-06-18 DIAGNOSIS — R09.81 NASAL CONGESTION: ICD-10-CM

## 2025-06-18 NOTE — NURSING NOTE
"Initial /85   Pulse 74   Temp 97.2  F (36.2  C) (Tympanic)   Ht 1.854 m (6' 1\")   Wt 103 kg (227 lb)   SpO2 99%   BMI 29.95 kg/m   Estimated body mass index is 29.95 kg/m  as calculated from the following:    Height as of this encounter: 1.854 m (6' 1\").    Weight as of this encounter: 103 kg (227 lb). .  Maria Del Rosario Spears MA    "

## 2025-06-18 NOTE — PATIENT INSTRUCTIONS
You were seen by Marina Suggs CNP.  If you have questions or concerns regarding your appointment today, you can reach out to our call center at [phone].  The following has been recommended at your appointment today:    Schedule CT scan and I will reach out through [facility].   Schedule with .   Continue current medication plan until you see .   Decrease the Budesonide to once daily.   Today, I do not see any nasal polyps. That doesn't mean they won't return.     Turbinates are found in both nostrils and help humidify/dehumidify the air we breath in and out. They can become enlarged from allergens. Therefore, the following can help with allergy symptoms.

## 2025-06-18 NOTE — LETTER
6/18/2025      Gutierrez Orta  [address] [facility] [id number]      Dear Colleague,    Thank you for referring your patient, Gutierrez Orta, to the [facility]. Please see a copy of my visit note below.    Chief Complaint   Patient presents with     Follow Up     sinusitis     History of Present Illness   Gutierrez Orta is a 59 year old male who presents for evaluation. Referred by Dr.Umair Zuniga for concerns of sinus congestion.     VISIT 04/16/25:   Gutierrez, tells me that a month ago he developed a sinus infection and he has been unable to breath through his nose. He tells me his previous polyp/septoplasty surgery only lasted for 6 months. He describes inability to have air flow through his ost. The past couple of days he develop sinus pressure/pain and difficulty breathing though the nose.      The patient describes symptoms of nasal congestion for the past 25 years. The patient notes history of environmental allergies. They have  been tested for allergies in the past. Treatments have included nasal steroids (Flonase), and oral antihistamines (Zyrtec, Claritin). The treatments seem to not work. No history of nasal trauma. The patient reports/nasal obstruction left greater than right, nasal congestion, no rhinorrhea, post nasal drainage, no taste/smell disturbance, face pain/pressure/fullness. No  history of epistaxis. No history of smoking. No history of migraine headache. No  history of asthma. No history of aspirin/NSAID sensitivity. He has a history of GERD and symptoms occur at night. He takes tums.      HISTORY OF ENT SURGERIES:   Unknown dates - Polyp removal and septoplasty (25 years ago)     No previous nose or sinus imaging.     RECOMMENDATION: Start using Budesonide nasal saline rinse, Flonase, and treated with Prednisone 20 mg for 5 days.     VISIT 06/18/25:   Today, he is here for a follow up. The patient does not notice much change. He gets a little relief from the Flonase.  He is still using the Budesonide nasal rinses. He doesn't feel the prednisone helped.     Past Medical History  Patient Active Problem List   Diagnosis     Allergic rhinitis     GERD     Hyperlipidemia LDL goal <130     Elevated uric acid in blood     Midline low back pain, unspecified chronicity, unspecified whether sciatica present     Current Medications     Current Outpatient Medications:      atorvastatin (LIPITOR) 20 MG tablet, Take 1 tablet (20 mg) by mouth daily., Disp: 90 tablet, Rfl: 3     budesonide (PULMICORT) 0.5 MG/2ML neb solution, Use one vial 2 times daily in the [facility] Nasal Rinse., Disp: 840 mL, Rfl: 0     fluticasone (FLONASE) 50 MCG/ACT nasal spray, Spray 2 sprays into both nostrils daily., Disp: 16 g, Rfl: 11     predniSONE (DELTASONE) 20 MG tablet, Take 1 tablet (20 mg) by mouth daily., Disp: 5 tablet, Rfl: 0    Allergies  No Known Allergies    Social History   Social History     Socioeconomic History     Marital status:    Tobacco Use     Smoking status: Never     Passive exposure: Never     Smokeless tobacco: Never   Vaping Use     Vaping status: Never Used   Substance and Sexual Activity     Alcohol use: Yes     Comment: social     Drug use: No     Sexual activity: Not Currently     Partners: Female   Other Topics Concern     Parent/sibling w/ CABG, MI or angioplasty before 65F 55M? No     Social Drivers of Health     Financial Resource Strain: Low Risk  (1/22/2025)    Financial Resource Strain      Within the past 12 months, have you or your family members you live with been unable to get utilities (heat, electricity) when it was really needed?: No   Food Insecurity: Low Risk  (1/22/2025)    Food Insecurity      Within the past 12 months, did you worry that your food would run out before you got money to buy more?: No      Within the past 12 months, did the food you bought just not last and you didn t have money to get more?: No   Transportation Needs: Low Risk  (1/22/2025)     "Transportation Needs      Within the past 12 months, has lack of transportation kept you from medical appointments, getting your medicines, non-medical meetings or appointments, work, or from getting things that you need?: No   Physical Activity: Unknown (1/22/2025)    Exercise Vital Sign      Days of Exercise per Week: 1 day   Stress: No Stress Concern Present (1/22/2025)    [employer] [place] [employer] - Occupational Stress Questionnaire      Feeling of Stress : Not at all   Social Connections: Unknown (1/22/2025)    Social Connection and Isolation Panel [NHANES]      Frequency of Social Gatherings with Friends and Family: Three times a week   Interpersonal Safety: Low Risk  (1/22/2025)    Interpersonal Safety      Do you feel physically and emotionally safe where you currently live?: Yes      Within the past 12 months, have you been hit, slapped, kicked or otherwise physically hurt by someone?: No      Within the past 12 months, have you been humiliated or emotionally abused in other ways by your partner or ex-partner?: No   Housing Stability: Low Risk  (1/22/2025)    Housing Stability      Do you have housing? : Yes      Are you worried about losing your housing?: No       Family History  Family History   Problem Relation Age of Onset     Cancer Father         Lung-smoker     Other Cancer Father      Neurologic Disorder Paternal Grandfather         parkinsons     C.A.D. Maternal Grandfather         CAB in his 50s     Respiratory Maternal Grandfather         Emphysemia     Prostate Cancer No family hx of      Colon Cancer No family hx of        Review of Systems  As per HPI and PMHx, otherwise 10+ comprehensive system review is negative.    Physical Exam  /85   Pulse 74   Temp 97.2  F (36.2  C) (Tympanic)   Ht 1.854 m (6' 1\")   Wt 103 kg (227 lb)   SpO2 99%   BMI 29.95 kg/m    GENERAL: The patient is a pleasant, cooperative 59 year old male in no acute distress.  HEAD: Normocephalic, " atraumatic. Hair and scalp are normal.  EYES: Pupils are equal, round, reactive to light and accommodation. Extraocular movements are intact. The sclera nonicteric without injection. The extraocular structures are normal.  EARS: Normal shape and symmetry. No tenderness when palpating the mastoid or tragal areas bilaterally. No mastoid erythema or fluctuance. Otoscopic exam on the right reveals a no amount of cerumen. The right tympanic membrane is round, intact without evidence of effusion, good landmarks.  No retraction, granulation, or drainage. Otoscopic exam on the left reveals a no amount of cerumen. The left tympanic membrane is round, intact without evidence of effusion, good landmarks.  No retraction, granulation, or drainage.  NOSE: Nares are patent.  Nasal mucosa is pink. Hypertrophy turbinates.  ORAL CAVITY: Lips are normal. Dentition is in good repair. Mucous membranes are moist. Tongue is mobile, protrudes to the midline.  Palate elevates symmetrically. Tonsils are +0. No erythema or exudate. No oral cavity or oropharyngeal masses, lesions, ulcerations, or leukoplakia.  NECK: Supple, trachea is midline. There is no palpable cervical lymphadenopathy or masses bilaterally. Palpation of the bilateral parotid and submandibular areas reveal no masses. No thyromegaly.    NEUROLOGIC: Cranial nerves II through XII are grossly intact. Voice is strong. Patient is House-Brackmann I/VI bilaterally.  CARDIOVASCULAR: Extremities are warm and well-perfused. No significant peripheral edema.  RESPIRATORY: Patient has nonlabored breathing without cough, wheeze, stridor.  PSYCHIATRIC: Patient is alert and oriented. Mood and affect appear normal.  SKIN: Warm and dry. No scalp, face, or neck lesions noted.      Procedure: Flexible Nasal Endoscopy  Indication: Chronic nose and sinus symptoms    To best visualize the sinonasal anatomy and due to the chief complaint and HPI, I proceeded with flexible fiberoptic nasal  endoscopy.  The bilateral nasal cavities were anesthetized and decongested. The bilateralnasal cavities were then examined using flexible fiberoptic nasal endoscope. The right nasal cavity was without masses, polyps, or mucopurulence. The right middle turbinate and middle meatus was normal in appearance. The sphenoethmoid recess, inferior meatus, superior meatus, and frontal sinus outflow areas were clear. The left nasal cavity was without masses, polyps, or mucopurulence. The left middle turbinate and middle meatus was normal in appearance. The sphenoethmoid recess, inferior meatus, superior meatus, and frontal sinus outflow areas were clear. The sinonasalmucosa appeared normal. The nasal septum possibly deviated. The inferior turbinates were  hypertrophied. The nasopharynx had a normal appearance with normal Eustachian tube openings and fossa of Rosenmuller bilaterally. +2 adenoid tissue. The scope was removed. The patient tolerated the procedure well.    Assessment and Plan     ICD-10-CM    1. Nasal polyp  J33.9       2. Hypertrophy, nasal, turbinate  J34.3       3. Nasal congestion  R09.81         It was my pleasure seeing Gutierrez Orta today in clinic. Overall, the patient's nasal polyps have improved. We discussed reducing the budesonide rinse to once daily. He does have evidence of hypertrophy turbinates with a possible deviated septum. We discussed getting a CT scan to rule out any other abnormalities and following up with  for nasal obstruction in the future.     Continue using Flonase and antihistamine.     JAQUI Cuevas CNP  Otolaryngology  [place] [facility] [place]      Again, thank you for allowing me to participate in the care of your patient.        Sincerely,        JAQUI Cuevas CNP    Electronically signed

## (undated) RX ORDER — PROPOFOL 10 MG/ML
INJECTION, EMULSION INTRAVENOUS
Status: DISPENSED
Start: 2021-05-07